# Patient Record
Sex: MALE | Race: WHITE | NOT HISPANIC OR LATINO | Employment: FULL TIME | URBAN - METROPOLITAN AREA
[De-identification: names, ages, dates, MRNs, and addresses within clinical notes are randomized per-mention and may not be internally consistent; named-entity substitution may affect disease eponyms.]

---

## 2017-03-04 ENCOUNTER — HOSPITAL ENCOUNTER (EMERGENCY)
Facility: CLINIC | Age: 32
Discharge: HOME | End: 2017-03-04
Attending: EMERGENCY MEDICINE | Admitting: EMERGENCY MEDICINE
Payer: COMMERCIAL

## 2017-03-04 VITALS
SYSTOLIC BLOOD PRESSURE: 160 MMHG | TEMPERATURE: 97.5 F | RESPIRATION RATE: 24 BRPM | OXYGEN SATURATION: 97 % | BODY MASS INDEX: 22.96 KG/M2 | HEART RATE: 120 BPM | DIASTOLIC BLOOD PRESSURE: 94 MMHG | HEIGHT: 69 IN | WEIGHT: 155 LBS

## 2017-03-04 DIAGNOSIS — F41.0 PANIC ATTACK: ICD-10-CM

## 2017-03-04 PROCEDURE — 99283 EMERGENCY DEPT VISIT LOW MDM: CPT

## 2017-03-04 PROCEDURE — 25000132 ZZH RX MED GY IP 250 OP 250 PS 637: Performed by: EMERGENCY MEDICINE

## 2017-03-04 PROCEDURE — 93005 ELECTROCARDIOGRAM TRACING: CPT

## 2017-03-04 RX ORDER — LORAZEPAM 1 MG/1
1 TABLET ORAL ONCE
Status: COMPLETED | OUTPATIENT
Start: 2017-03-04 | End: 2017-03-04

## 2017-03-04 RX ADMIN — LORAZEPAM 1 MG: 1 TABLET ORAL at 21:52

## 2017-03-04 ASSESSMENT — ENCOUNTER SYMPTOMS
VOMITING: 0
NERVOUS/ANXIOUS: 1
NAUSEA: 0
DIZZINESS: 1

## 2017-03-04 NOTE — ED AVS SNAPSHOT
[facility] Emergency Department    [address] [place] [address]    Phone:  [phone]    Fax:  [phone]                                       Wellington Guaman   MRN: [id number]    Department:  [facility] Emergency Department   Date of Visit:  3/4/2017           Patient Information     Date Of Birth          1985        Your diagnoses for this visit were:     Panic attack        You were seen by Jamel Chavez MD.      Follow-up Information     Please follow up.    Why:  Please recheck with your doctor on Tuesday        Discharge Instructions         Anxiety Reaction  Anxiety is the feeling we all get when we think something bad might happen. It is a normal response to stress and usually causes only a mild reaction. When anxiety becomes more severe, it can interfere with daily life. In some cases, you may not even be aware of what it is you re anxious about. There may also be a genetic link or it may be a learned behavior in the home.  Both psychological and physical triggers cause stress reaction. It's often a response to fear or emotional stress, real or imagined. This stress may come from home, family, work, or social relationships.  During an anxiety reaction, you may feel:    Helpless    Nervous    Depressed    Irritable  Your body may show signs of anxiety in many ways. You may experience:    Dry mouth    Shakiness    Dizziness    Weakness    Trouble breathing    Breathing fast (hyperventilating)    Chest pressure    Sweating    Headache    Nausea    Diarrhea    Tiredness    Inability to sleep    Sexual problems  Home care    Try to locate the sources of stress in your life. They may not be obvious. These may include:    Daily hassles of life (traffic jams, missed appointments, car troubles, etc.)    Major life changes, both good (new baby, job promotion) and bad (loss of job, loss of loved one)    Overload: feeling that you have too many  responsibilities and can't take care of all of them at once    Feeling helpless, feeling that your problems are beyond what you re able to solve    Notice how your body reacts to stress. Learn to listen to your body signals. This will help you take action before the stress becomes severe.    When you can, do something about the source of your stress. (Avoid hassles, limit the amount of change that happens in your life at one time and take a break when you feel overloaded).    Unfortunately, many stressful situations can't be avoided. It is necessary to learn how to better manage stress. There are many proven methods that will reduce your anxiety. These include simple things like exercise, good nutrition and adequate rest. Also, there are certain techniques that are helpful:    Relaxation    Breathing exercises    Visualization    Biofeedback    Meditation  For more information about this, consult your doctor or go to a local bookstore and review the many books and tapes available on this subject.  Follow-up care  If you feel that your anxiety is not responding to self-help measures, contact your doctor or make an appointment with a counselor. You may need short-term psychological counseling and temporary medicine to help you manage stress.  Call 911  Call your healthcare provider right away if any of these occur:    Trouble breathing    Confusion    Drowsiness or trouble wakening    Fainting or loss of consciousness    Rapid heart rate    Seizure    New chest pain that becomes more severe, lasts longer, or spreads into your shoulder, arm, neck, jaw, or back  When to seek medical advice  Call your healthcare provider right away if any of these occur:    Your symptoms get worse    Severe headache not relieved by rest and mild pain reliever    5299-4844 [employer]. [address] [place] [address], [place] [address]. All rights reserved. This information is not intended as a substitute for professional medical  care. Always follow your healthcare professional's instructions.          Future Appointments        Provider Department Dept Phone Center    3/7/2017 10:00 AM Susan Haase, APRN [facility] [phone] [facility]      24 Hour Appointment Hotline       To make an appointment at any [facility], call [phone] ([phone]). If you don't have a family doctor or clinic, we will help you find one. [facility] are conveniently located to serve the needs of you and your family.             Review of your medicines      Notice     You have not been prescribed any medications.            Procedures and tests performed during your visit     EKG 12-lead, tracing only      Orders Needing Specimen Collection     None      Pending Results     No orders found from 3/2/2017 to 3/5/2017.            Pending Culture Results     No orders found from 3/2/2017 to 3/5/2017.             Test Results from your hospital stay            Clinical Quality Measure: Blood Pressure Screening     Your blood pressure was checked while you were in the emergency department today. The last reading we obtained was  BP: (!) 160/94 . Please read the guidelines below about what these numbers mean and what you should do about them.  If your systolic blood pressure (the top number) is less than 120 and your diastolic blood pressure (the bottom number) is less than 80, then your blood pressure is normal. There is nothing more that you need to do about it.  If your systolic blood pressure (the top number) is 120-139 or your diastolic blood pressure (the bottom number) is 80-89, your blood pressure may be higher than it should be. You should have your blood pressure rechecked within a year by a primary care provider.  If your systolic blood pressure (the top number) is 140 or greater or your diastolic blood pressure (the bottom number) is 90 or greater, you may have high blood pressure. High blood pressure is treatable, but if  "left untreated over time it can put you at risk for heart attack, stroke, or kidney failure. You should have your blood pressure rechecked by a primary care provider within the next 4 weeks.  If your provider in the emergency department today gave you specific instructions to follow-up with your doctor or provider even sooner than that, you should follow that instruction and not wait for up to 4 weeks for your follow-up visit.        Thank you for choosing [facility]       Thank you for choosing [facility] for your care. Our goal is always to provide you with excellent care. Hearing back from our patients is one way we can continue to improve our services. Please take a few minutes to complete the written survey that you may receive in the mail after you visit with us. Thank you!        [employer] Information     [employer] lets you send messages to your doctor, view your test results, renew your prescriptions, schedule appointments and more. To sign up, go to [email or web address] . Click on \"Log in\" on the left side of the screen, which will take you to the Welcome page. Then click on \"Sign up Now\" on the right side of the page.     You will be asked to enter the access code listed below, as well as some personal information. Please follow the directions to create your username and password.     Your access code is: [email or web address]  Expires: 2017  9:12 PM     Your access code will  in 90 days. If you need help or a new code, please call your [facility] clinic or [phone].        Care EveryWhere ID     This is your Care EveryWhere ID. This could be used by other organizations to access your [facility] medical records  [id number]        After Visit Summary       This is your record. Keep this with you and show to your community pharmacist(s) and doctor(s) at your next visit.                  "

## 2017-03-04 NOTE — ED AVS SNAPSHOT
[facility] Emergency Department    [address]    [facility] [address]    Phone:  [phone]    Fax:  [phone]                                       Wellington Guaman   MRN: [id number]    Department:  [facility] Emergency Department   Date of Visit:  3/4/2017           After Visit Summary Signature Page     I have received my discharge instructions, and my questions have been answered. I have discussed any challenges I see with this plan with the nurse or doctor.    ..........................................................................................................................................  Patient/Patient Representative Signature      ..........................................................................................................................................  Patient Representative Print Name and Relationship to Patient    ..................................................               ................................................  Date                                            Time    ..........................................................................................................................................  Reviewed by Signature/Title    ...................................................              ..............................................  Date                                                            Time

## 2017-03-05 LAB — INTERPRETATION ECG - MUSE: NORMAL

## 2017-03-05 NOTE — ED NOTES
Pt arrives to the ED with symptoms of a panic attack. Pt states he has had many panic attacks in the past but feels that this one is different. Pt states having numbness/tingling in left arm, bilateral hands, and face. Pt states also feeling short of breath, dizzy and nauseous. Pt states these symptoms started around 1800. Does not take any medications for anxiety.

## 2017-03-05 NOTE — DISCHARGE INSTRUCTIONS

## 2017-03-05 NOTE — ED PROVIDER NOTES
"  History     Chief Complaint:  Panic Attack      HPI   Wellington Guaman is a 31 year old male with history of agoraphobia and anxiety who presents with is wife for evaluation of a panic attack - like symptoms.     The patient states that he was watching a movie at a movie theater at about 1800 this afternoon when he suddenly began to feel a pressure sensation to his head with associated troubled breathing and numbness / tingling sensation to his upper and lower extremities in addition to his lips. The patient states that he has a history of panic attacks though this one is much more severe in both intensity and chronicity. He states that he had no prior symptoms during the course of the day today.     The patient denies any suicidal or homicidal ideation or visual or auditory hallucinations. He reports no alcohol or drug ingestion. He states that he has otherwise been well with no fever, chills, cough, nasal congestion, nausea, vomiting, or changes in his bowel or bladder habits.    Allergies:  Ceclor   Citalopram      Medications:    The patient is currently on no regular medications.     Past Medical History:    Agoraphobia  Anxiety     Past Surgical History:    The patient does not have any pertinent past surgical history.     Family History:    No past pertinent family history.     Social History:  Negative for tobacco use.  Positive for alcohol use.    Marital Status:  Single [1]    Review of Systems   Gastrointestinal: Negative for nausea and vomiting.   Neurological: Positive for dizziness.        Positive for tingling sensation to upper and lower extremities.    Psychiatric/Behavioral: The patient is nervous/anxious.    All other systems reviewed and are negative.    Physical Exam   First Vitals:  BP: (!) 160/94  Pulse: 120  Heart Rate: 120  Temp: 97.5  F (36.4  C)  Resp: 24  Height: 175.3 cm (5' 9\")  Weight: 70.3 kg (155 lb)  SpO2: 100 %      Physical Exam   Constitutional:  Appears well-developed and " well-nourished. Alert. Conversant. Non toxic.  HENT:   Head: Atraumatic.   Nose: Nose normal.  Mouth/Throat: Oral mucosa is clear and moist. no trismus. Pharynx normal. Tonsils symmetric. No tonsillar enlargement, erythema, or exudate.  Eyes: Conjunctivae normal. EOM normal. Pupils equal, round, and reactive to light. No scleral icterus.   Neck: Normal range of motion. Neck supple. No tracheal deviation present. No JVD  Cardiovascular: Normal rate, regular rhythm. No gallop. No friction rub. No murmur heard. Symmetric radial artery pulses   Pulmonary/Chest: Effort normal. No stridor. No respiratory distress. No wheezes. No rales. No rhonchi . No tenderness.   Abdominal: Soft. Bowel sounds normal. No distension. No mass. No tenderness. No rebound. No guarding.   Musculoskeletal: Normal range of motion. No edema. No tenderness. No deformity   Lymph: No cervical adenopathy.   Neurological: Alert and oriented to person, place, and time. Normal strength. CN II-VII intact. No sensory deficit. GCS eye subscore is 4. GCS verbal subscore is 5. GCS motor subscore is 6. Normal coordination   Skin: Skin is warm and dry. No rash noted. No pallor. Normal capillary refill.  Psychiatric:  Somewhat anxious appearing.  Very polite.  Answers questions appropriately.  No signs of drug or alcohol intoxication.  Has a history of panic attacks in the past but never with symptoms like tonight.   He is very worried that he may be having a heart problem or stroke.  Wife seems supportive.  Deny any other recent  Stressors or events.  He has however been having enough anxiety symptoms in his life recently that he is already scheduled for an appointment with a primary care provider on Tuesday with plans to discuss these symptoms.    Emergency Department Course   ECG:  Time: 2200  Vent. Rate 86 bpm. ID interval 130. QRS duration 90. QT/QTc 382/457. P-R-T axis 66 51 26. Normal sinus rhythm with sinus arrhythmia. Normal ECG. Read time: 2219.       Interventions:  Ativan 1mg oral      Emergency Department Course:  Nursing notes and vitals reviewed. I performed an exam of the patient as documented above.     EKG obtained in the ED, see results above.      2301 I reevaluated the patient and provided an update in regards to his ED course, the patient states that he is feeling much improved.     Findings and plan explained to the Patient. Patient discharged home with instructions regarding supportive care, medications, and reasons to return. The importance of close follow-up was reviewed.     Impression & Plan    Medical Decision Making:  Wellington Guaman is a 31 year old male who came to the ER today with his wife for evaluations of feelings of anxiousness associated with heaviness in both of his arms, numbness in his hands and forearms, facial numbness, lightheadedness.  Sometimes began a few hours prior to arrival was watching a movie. His wife also noted that he seems somewhat anxious and was hyperventilating.  He has a history of anxiety and panic attacks but never with presenting with symptoms like these.  He is not feeling any chest pain or racing heart.  No antecedent symptoms or recent illnesses.  No recent travel.  No leg swelling.  No nausea or vomiting.  No drug use. No definite psychosocial stressor to cause a panic attack tonight.    EKG shows sinus rhythm with sinus arrhythmia and no ischemia.  No dysrhythmic genic Abnormality.  Cardiac monitor shows normal sinus rhythm.  After Ativan and his symptoms have resolved.  He is feeling back to normal.    At this point I suspect he was suffering from a panic attack  With symptoms related to hyperventilation.  He has an appointment to see a PCP in 2 days on Tuesday so I recommended that he keep that.  At this point is very polite  Oriented  Cooperative..  No indication for hold or  Inpatient admission.  He is comfortable and eager for discharge to home with his wife.    Critical Care time:   none    Diagnosis:    ICD-10-CM    1. Panic attack F41.0      I, Yogesh Gonzalez, am serving as a scribe on 3/4/2017 at 9:36 PM to personally document services performed by Jamel Chavez MD based on my observations and the provider's statements to me.      Yogesh Gonzalez  3/4/2017   [facility] EMERGENCY DEPARTMENT       Jamel Chavez MD  03/05/17 0049

## 2017-03-07 ENCOUNTER — OFFICE VISIT (OUTPATIENT)
Dept: FAMILY MEDICINE | Facility: CLINIC | Age: 32
End: 2017-03-07
Payer: COMMERCIAL

## 2017-03-07 VITALS
HEART RATE: 98 BPM | DIASTOLIC BLOOD PRESSURE: 78 MMHG | SYSTOLIC BLOOD PRESSURE: 114 MMHG | HEIGHT: 69 IN | WEIGHT: 156 LBS | RESPIRATION RATE: 12 BRPM | BODY MASS INDEX: 23.11 KG/M2 | TEMPERATURE: 97.8 F | OXYGEN SATURATION: 100 %

## 2017-03-07 DIAGNOSIS — F41.0 PANIC ATTACK: ICD-10-CM

## 2017-03-07 DIAGNOSIS — F33.0 MILD RECURRENT MAJOR DEPRESSION (H): Chronic | ICD-10-CM

## 2017-03-07 DIAGNOSIS — F41.9 ANXIETY: Primary | ICD-10-CM

## 2017-03-07 LAB
ALBUMIN SERPL-MCNC: 4.4 G/DL (ref 3.4–5)
ALP SERPL-CCNC: 78 U/L (ref 40–150)
ALT SERPL W P-5'-P-CCNC: 24 U/L (ref 0–70)
ANION GAP SERPL CALCULATED.3IONS-SCNC: 7 MMOL/L (ref 3–14)
AST SERPL W P-5'-P-CCNC: 10 U/L (ref 0–45)
BASOPHILS # BLD AUTO: 0.1 10E9/L (ref 0–0.2)
BASOPHILS NFR BLD AUTO: 0.9 %
BILIRUB SERPL-MCNC: 0.7 MG/DL (ref 0.2–1.3)
BUN SERPL-MCNC: 11 MG/DL (ref 7–30)
CALCIUM SERPL-MCNC: 9.3 MG/DL (ref 8.5–10.1)
CHLORIDE SERPL-SCNC: 107 MMOL/L (ref 94–109)
CO2 SERPL-SCNC: 27 MMOL/L (ref 20–32)
CREAT SERPL-MCNC: 0.71 MG/DL (ref 0.66–1.25)
DIFFERENTIAL METHOD BLD: NORMAL
EOSINOPHIL # BLD AUTO: 0.1 10E9/L (ref 0–0.7)
EOSINOPHIL NFR BLD AUTO: 1.3 %
ERYTHROCYTE [DISTWIDTH] IN BLOOD BY AUTOMATED COUNT: 13.4 % (ref 10–15)
GFR SERPL CREATININE-BSD FRML MDRD: NORMAL ML/MIN/1.7M2
GLUCOSE SERPL-MCNC: 73 MG/DL (ref 70–99)
HCT VFR BLD AUTO: 44.9 % (ref 40–53)
HGB BLD-MCNC: 15.5 G/DL (ref 13.3–17.7)
LYMPHOCYTES # BLD AUTO: 1.5 10E9/L (ref 0.8–5.3)
LYMPHOCYTES NFR BLD AUTO: 27 %
MCH RBC QN AUTO: 29.9 PG (ref 26.5–33)
MCHC RBC AUTO-ENTMCNC: 34.5 G/DL (ref 31.5–36.5)
MCV RBC AUTO: 87 FL (ref 78–100)
MONOCYTES # BLD AUTO: 0.5 10E9/L (ref 0–1.3)
MONOCYTES NFR BLD AUTO: 8.8 %
NEUTROPHILS # BLD AUTO: 3.4 10E9/L (ref 1.6–8.3)
NEUTROPHILS NFR BLD AUTO: 62 %
PLATELET # BLD AUTO: 387 10E9/L (ref 150–450)
POTASSIUM SERPL-SCNC: 4.1 MMOL/L (ref 3.4–5.3)
PROT SERPL-MCNC: 8.2 G/DL (ref 6.8–8.8)
RBC # BLD AUTO: 5.18 10E12/L (ref 4.4–5.9)
SODIUM SERPL-SCNC: 141 MMOL/L (ref 133–144)
TSH SERPL DL<=0.005 MIU/L-ACNC: 1.61 MU/L (ref 0.4–4)
WBC # BLD AUTO: 5.4 10E9/L (ref 4–11)

## 2017-03-07 PROCEDURE — 36415 COLL VENOUS BLD VENIPUNCTURE: CPT | Performed by: NURSE PRACTITIONER

## 2017-03-07 PROCEDURE — 80050 GENERAL HEALTH PANEL: CPT | Performed by: NURSE PRACTITIONER

## 2017-03-07 PROCEDURE — 99213 OFFICE O/P EST LOW 20 MIN: CPT | Performed by: NURSE PRACTITIONER

## 2017-03-07 RX ORDER — CLONAZEPAM 0.5 MG/1
0.25-0.5 TABLET ORAL 2 TIMES DAILY PRN
Qty: 10 TABLET | Refills: 0 | Status: SHIPPED | OUTPATIENT
Start: 2017-03-07 | End: 2017-08-10

## 2017-03-07 RX ORDER — FLUOXETINE 10 MG/1
CAPSULE ORAL
Qty: 45 CAPSULE | Refills: 1 | Status: SHIPPED | OUTPATIENT
Start: 2017-03-07 | End: 2017-03-08

## 2017-03-07 ASSESSMENT — PATIENT HEALTH QUESTIONNAIRE - PHQ9: 5. POOR APPETITE OR OVEREATING: NEARLY EVERY DAY

## 2017-03-07 ASSESSMENT — ANXIETY QUESTIONNAIRES
6. BECOMING EASILY ANNOYED OR IRRITABLE: NEARLY EVERY DAY
GAD7 TOTAL SCORE: 20
1. FEELING NERVOUS, ANXIOUS, OR ON EDGE: NEARLY EVERY DAY
2. NOT BEING ABLE TO STOP OR CONTROL WORRYING: NEARLY EVERY DAY
3. WORRYING TOO MUCH ABOUT DIFFERENT THINGS: MORE THAN HALF THE DAYS
IF YOU CHECKED OFF ANY PROBLEMS ON THIS QUESTIONNAIRE, HOW DIFFICULT HAVE THESE PROBLEMS MADE IT FOR YOU TO DO YOUR WORK, TAKE CARE OF THINGS AT HOME, OR GET ALONG WITH OTHER PEOPLE: VERY DIFFICULT
5. BEING SO RESTLESS THAT IT IS HARD TO SIT STILL: NEARLY EVERY DAY
7. FEELING AFRAID AS IF SOMETHING AWFUL MIGHT HAPPEN: NEARLY EVERY DAY

## 2017-03-07 NOTE — NURSING NOTE
"Chief Complaint   Patient presents with     ER F/U       Initial /78 (BP Location: Left arm, Patient Position: Chair, Cuff Size: Adult Regular)  Pulse 98  Temp 97.8  F (36.6  C) (Oral)  Resp 12  Ht 5' 9\" (1.753 m)  Wt 156 lb (70.8 kg)  SpO2 100%  BMI 23.04 kg/m2 Estimated body mass index is 23.04 kg/(m^2) as calculated from the following:    Height as of this encounter: 5' 9\" (1.753 m).    Weight as of this encounter: 156 lb (70.8 kg).  Medication Reconciliation: complete   Cristy Puente CMA      "

## 2017-03-07 NOTE — PROGRESS NOTES
"  SUBJECTIVE:                                                    Wellington Guaman is a 31 year old male who presents to clinic today for the following health issues:    ED/UC Followup:    Facility:  [facility]  Date of visit: 3/4/17  Reason for visit: panic attack  Current Status: still having panic attacks, dizziness and nausea     Wellington reports going to the ED after panic attack-like symptoms 3 days ago. After leaving the movie theater w/ his wife that evening  He began feeling dizzy and nauseous. He has a hx of panic attacks but states \"has not felt anything like it before.\" He also noticed sudden onset of pressure in LUE, generalized numbness, racing heart, and SOB.     Wellington has experienced anxiety/panic attacks \"on and off\" for several years but has been worse the past few weeks. Also difficulty sleeping for the past year. Describes feeling \"too energized\" to sleep. He is able to fall asleep but cannot go back to sleep after waking up during the night. Has had difficulty sleeping every night for the past week. Getting on average 4 hours of sleep per night. He has not tried any medications to help w/ sleep. Does not drink caffeine. Denies episodes of high energy followed by episodes of low energy/depression.  PHQ 9 score of 10, JOSEE 7 score of 20.  Denies thoughts of harming self or others.     Wellington's symptoms have also been affecting his work. 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 Over the past few weeks he has missed some work due to feeling dizzy and nauseous and has been late due several days due to difficulty driving. States having to pull over an take deep breaths on way too work. He has not experienced any recent life changes or new stressors.     About 5 years ago he was prescribed alprazolam and took for a few years as needed when he felt \"on the edge.\" Was also prescribed metoprolol succinate in 6/2015 for PVCs but experienced uncomfortable " "side effects such as slow heart rate. Not currently taking any medications regularly.      Problem list and histories reviewed & adjusted, as indicated.  Additional history: as documented    Patient Active Problem List   Diagnosis     Hyperlipidemia with target LDL less than 130     Mild recurrent major depression since 2006      Panic attacks     Headache     Tachycardia     PVC's (premature ventricular contractions)     Past Surgical History   Procedure Laterality Date     No history of surgery         Social History   Substance Use Topics     Smoking status: Never Smoker     Smokeless tobacco: Never Used     Alcohol use Yes      Comment: 4 beers/wk     Family History   Problem Relation Age of Onset     CEREBROVASCULAR DISEASE       Family History Negative Mother      Family History Negative Father          No current outpatient prescriptions on file.     Allergies   Allergen Reactions     Ceclor [Cefaclor Monohydrate]      Citalopram      Nausea, panic        Reviewed and updated as needed this visit by clinical staff  Allergies  Meds       Reviewed and updated as needed this visit by Provider         ROS:  Constitutional, HEENT, cardiovascular, pulmonary, GI, , musculoskeletal, neuro, skin, endocrine and psych systems are negative, except as otherwise noted.    This document serves as a record of the services and decisions personally performed and made by Susan Haase, CNP. It was created on her behalf by Karen White, a trained medical scribe. The creation of this document is based the provider's statements to the medical scribe.  Karen White March 7, 2017 10:20 AM     OBJECTIVE:                                                    /78 (BP Location: Left arm, Patient Position: Chair, Cuff Size: Adult Regular)  Pulse 98  Temp 97.8  F (36.6  C) (Oral)  Resp 12  Ht 1.753 m (5' 9\")  Wt 70.8 kg (156 lb)  SpO2 100%  BMI 23.04 kg/m2  Body mass index is 23.04 kg/(m^2).  GENERAL: healthy, alert and no " distress  NECK: no adenopathy, no asymmetry, masses, or scars and thyroid normal to palpation  RESP: lungs clear to auscultation - no rales, rhonchi or wheezes  CV: regular rate and rhythm, normal S1 S2, no S3 or S4, no murmur, click or rub, no peripheral edema  NEURO: Normal strength and tone, mentation intact and speech normal  PSYCH: mentation appears normal, affect normal/bright    Diagnostic Test Results:  Results for orders placed or performed in visit on 03/07/17 (from the past 24 hour(s))   CBC with platelets differential   Result Value Ref Range    WBC 5.4 4.0 - 11.0 10e9/L    RBC Count 5.18 4.4 - 5.9 10e12/L    Hemoglobin 15.5 13.3 - 17.7 g/dL    Hematocrit 44.9 40.0 - 53.0 %    MCV 87 78 - 100 fl    MCH 29.9 26.5 - 33.0 pg    MCHC 34.5 31.5 - 36.5 g/dL    RDW 13.4 10.0 - 15.0 %    Platelet Count 387 150 - 450 10e9/L    Diff Method Automated Method     % Neutrophils 62.0 %    % Lymphocytes 27.0 %    % Monocytes 8.8 %    % Eosinophils 1.3 %    % Basophils 0.9 %    Absolute Neutrophil 3.4 1.6 - 8.3 10e9/L    Absolute Lymphocytes 1.5 0.8 - 5.3 10e9/L    Absolute Monocytes 0.5 0.0 - 1.3 10e9/L    Absolute Eosinophils 0.1 0.0 - 0.7 10e9/L    Absolute Basophils 0.1 0.0 - 0.2 10e9/L        ASSESSMENT/PLAN:                                                    Wellington was seen today for er f/u.    Diagnoses and all orders for this visit:    Anxiety:  Will contact therapist to set up appointment.    -     FLUoxetine (PROZAC) 10 MG capsule; Take 1 capsule every day for 2 weeks, after 2 weeks increase to 2 capsules (20mg) per day  Discussed risks and benefits of medication, need to take on a daily basis, will take at least 2-4 weeks to notice decrease in depression or anxiety, if symptoms of depression worsen stop taking medication and notify the clinic.  Is aware of emergency resources.  Encouraged increased in exercise and counseling.   Panic attacks: discussed not taking clonazepam and driving.   -     DEPRESSION  ACTION PLAN (DAP)  -     CBC with platelets differential  -     Comprehensive metabolic panel  -     TSH with free T4 reflex  -     clonazePAM (KLONOPIN) 0.5 MG tablet; Take 0.5-1 tablets (0.25-0.5 mg) by mouth 2 times daily as needed for anxiety    Mild recurrent major depression since 2006 :  Depression under adequate control, increased anxiety at this time.       The information in this document, created by the medical scribe for me, accurately reflects the services I personally performed and the decisions made by me. I have reviewed and approved this document for accuracy.   Susan Haase, CNP   Follow up in 4-6 weeks, sooner as needed.  Susan Haase, APRN CNP  [facility]

## 2017-03-07 NOTE — PATIENT INSTRUCTIONS

## 2017-03-07 NOTE — MR AVS SNAPSHOT
After Visit Summary   3/7/2017    Wellington Guaman    MRN: [id number]           Patient Information     Date Of Birth          1985        Visit Information        Provider Department      3/7/2017 10:00 AM Haase, Susan Rachele, APRN [facility]        Today's Diagnoses     Panic attacks    -  1    Tachycardia        Anxiety          Care Instructions      Anxiety Reaction  Anxiety is the feeling we all get when we think something bad might happen. It is a normal response to stress and usually causes only a mild reaction. When anxiety becomes more severe, it can interfere with daily life. In some cases, you may not even be aware of what it is you re anxious about. There may also be a genetic link or it may be a learned behavior in the home.  Both psychological and physical triggers cause stress reaction. It's often a response to fear or emotional stress, real or imagined. This stress may come from home, family, work, or social relationships.  During an anxiety reaction, you may feel:    Helpless    Nervous    Depressed    Irritable  Your body may show signs of anxiety in many ways. You may experience:    Dry mouth    Shakiness    Dizziness    Weakness    Trouble breathing    Breathing fast (hyperventilating)    Chest pressure    Sweating    Headache    Nausea    Diarrhea    Tiredness    Inability to sleep    Sexual problems  Home care    Try to locate the sources of stress in your life. They may not be obvious. These may include:    Daily hassles of life (traffic jams, missed appointments, car troubles, etc.)    Major life changes, both good (new baby, job promotion) and bad (loss of job, loss of loved one)    Overload: feeling that you have too many responsibilities and can't take care of all of them at once    Feeling helpless, feeling that your problems are beyond what you re able to solve    Notice how your body reacts to stress. Learn to listen to your body signals.  This will help you take action before the stress becomes severe.    When you can, do something about the source of your stress. (Avoid hassles, limit the amount of change that happens in your life at one time and take a break when you feel overloaded).    Unfortunately, many stressful situations can't be avoided. It is necessary to learn how to better manage stress. There are many proven methods that will reduce your anxiety. These include simple things like exercise, good nutrition and adequate rest. Also, there are certain techniques that are helpful:    Relaxation    Breathing exercises    Visualization    Biofeedback    Meditation  For more information about this, consult your doctor or go to a local bookstore and review the many books and tapes available on this subject.  Follow-up care  If you feel that your anxiety is not responding to self-help measures, contact your doctor or make an appointment with a counselor. You may need short-term psychological counseling and temporary medicine to help you manage stress.  Call 911  Call your healthcare provider right away if any of these occur:    Trouble breathing    Confusion    Drowsiness or trouble wakening    Fainting or loss of consciousness    Rapid heart rate    Seizure    New chest pain that becomes more severe, lasts longer, or spreads into your shoulder, arm, neck, jaw, or back  When to seek medical advice  Call your healthcare provider right away if any of these occur:    Your symptoms get worse    Severe headache not relieved by rest and mild pain reliever    6395-0774 [employer]. [address] [place] [address]. All rights reserved. This information is not intended as a substitute for professional medical care. Always follow your healthcare professional's instructions.              Follow-ups after your visit        Follow-up notes from your care team     Return in about 4 weeks (around 4/4/2017).      Who to contact     If you  "have questions or need follow up information about today's clinic visit or your schedule please contact [facility] directly at [phone].  Normal or non-critical lab and imaging results will be communicated to you by MyChart, letter or phone within 4 business days after the clinic has received the results. If you do not hear from us within 7 days, please contact the clinic through [employer] or phone. If you have a critical or abnormal lab result, we will notify you by phone as soon as possible.  Submit refill requests through [employer] or call your pharmacy and they will forward the refill request to us. Please allow 3 business days for your refill to be completed.          Additional Information About Your Visit        [employer] Information     [employer] lets you send messages to your doctor, view your test results, renew your prescriptions, schedule appointments and more. To sign up, go to [email or web address] . Click on \"Log in\" on the left side of the screen, which will take you to the Welcome page. Then click on \"Sign up Now\" on the right side of the page.     You will be asked to enter the access code listed below, as well as some personal information. Please follow the directions to create your username and password.     Your access code is: [email or web address]  Expires: 2017  9:12 PM     Your access code will  in 90 days. If you need help or a new code, please call your [place] clinic or [phone].        Care EveryWhere ID     This is your Care EveryWhere ID. This could be used by other organizations to access your [place] medical records  [id number]        Your Vitals Were     Pulse Temperature Respirations Height Pulse Oximetry BMI (Body Mass Index)    98 97.8  F (36.6  C) (Oral) 12 5' 9\" (1.753 m) 100% 23.04 kg/m2       Blood Pressure from Last 3 Encounters:   17 114/78   17 (!) 160/94   06/18/15 124/64    Weight from Last 3 Encounters:   17 156 lb (70.8 kg) "   03/04/17 155 lb (70.3 kg)   06/18/15 164 lb (74.4 kg)              We Performed the Following     CBC with platelets differential     Comprehensive metabolic panel     DEPRESSION ACTION PLAN (DAP)     TSH with free T4 reflex          Today's Medication Changes          These changes are accurate as of: 3/7/17 11:01 AM.  If you have any questions, ask your nurse or doctor.               Start taking these medicines.        Dose/Directions    clonazePAM 0.5 MG tablet   Commonly known as:  klonoPIN   Used for:  Panic attack        Dose:  0.25-0.5 mg   Take 0.5-1 tablets (0.25-0.5 mg) by mouth 2 times daily as needed for anxiety   Quantity:  10 tablet   Refills:  0       FLUoxetine 10 MG capsule   Commonly known as:  PROzac   Used for:  Anxiety        Take 1 capsule every day for 2 weeks, after 2 weeks increase to 2 capsules (20mg) per day   Quantity:  45 capsule   Refills:  1            Where to get your medicines      These medications were sent to [facility] [address] [place] [address] [address] [address] [facility] [address] [facility] [address]     Phone:  [phone]     FLUoxetine 10 MG capsule         Some of these will need a paper prescription and others can be bought over the counter.  Ask your nurse if you have questions.     Bring a paper prescription for each of these medications     clonazePAM 0.5 MG tablet                Primary Care Provider    Physician No Ref-Primary       No address on file        Thank you!     Thank you for choosing [facility]  for your care. Our goal is always to provide you with excellent care. Hearing back from our patients is one way we can continue to improve our services. Please take a few minutes to complete the written survey that you may receive in the mail after your visit with us. Thank you!             Your Updated Medication List - Protect others around you: Learn how to safely use, store and throw away your  medicines at www.disposemymeds.org.          This list is accurate as of: 3/7/17 11:01 AM.  Always use your most recent med list.                   Brand Name Dispense Instructions for use    clonazePAM 0.5 MG tablet    klonoPIN    10 tablet    Take 0.5-1 tablets (0.25-0.5 mg) by mouth 2 times daily as needed for anxiety       FLUoxetine 10 MG capsule    PROzac    45 capsule    Take 1 capsule every day for 2 weeks, after 2 weeks increase to 2 capsules (20mg) per day

## 2017-03-07 NOTE — LETTER
[facility]  [address] [place], [address]  [phone]        March 8, 2017      Wellington Guaman  [address] [facility] [address]        Sarmad Martinez lab results are as below:   1)  TSH (thyroid level) 1.61 which is normal (range 0.4-4)   2)  CBC (checks for anemia and infection) was normal.   3)  Glucose was normal at 73 (normal fasting is <100).     Results for orders placed or performed in visit on 03/07/17   CBC with platelets differential   Result Value Ref Range    WBC 5.4 4.0 - 11.0 10e9/L    RBC Count 5.18 4.4 - 5.9 10e12/L    Hemoglobin 15.5 13.3 - 17.7 g/dL    Hematocrit 44.9 40.0 - 53.0 %    MCV 87 78 - 100 fl    MCH 29.9 26.5 - 33.0 pg    MCHC 34.5 31.5 - 36.5 g/dL    RDW 13.4 10.0 - 15.0 %    Platelet Count 387 150 - 450 10e9/L    Diff Method Automated Method     % Neutrophils 62.0 %    % Lymphocytes 27.0 %    % Monocytes 8.8 %    % Eosinophils 1.3 %    % Basophils 0.9 %    Absolute Neutrophil 3.4 1.6 - 8.3 10e9/L    Absolute Lymphocytes 1.5 0.8 - 5.3 10e9/L    Absolute Monocytes 0.5 0.0 - 1.3 10e9/L    Absolute Eosinophils 0.1 0.0 - 0.7 10e9/L    Absolute Basophils 0.1 0.0 - 0.2 10e9/L   Comprehensive metabolic panel   Result Value Ref Range    Sodium 141 133 - 144 mmol/L    Potassium 4.1 3.4 - 5.3 mmol/L    Chloride 107 94 - 109 mmol/L    Carbon Dioxide 27 20 - 32 mmol/L    Anion Gap 7 3 - 14 mmol/L    Glucose 73 70 - 99 mg/dL    Urea Nitrogen 11 7 - 30 mg/dL    Creatinine 0.71 0.66 - 1.25 mg/dL    GFR Estimate >90  Non  GFR Calc   >60 mL/min/1.7m2    GFR Estimate If Black >90   GFR Calc   >60 mL/min/1.7m2    Calcium 9.3 8.5 - 10.1 mg/dL    Bilirubin Total 0.7 0.2 - 1.3 mg/dL    Albumin 4.4 3.4 - 5.0 g/dL    Protein Total 8.2 6.8 - 8.8 g/dL    Alkaline Phosphatase 78 40 - 150 U/L    ALT 24 0 - 70 U/L    AST 10 0 - 45 U/L   TSH with free T4 reflex   Result Value Ref Range    TSH 1.61 0.40 - 4.00 mU/L     If you have any  questions do not hesitate to call the clinic to discuss the results with me further.     Sincerely,     Susan Haase, CNP

## 2017-03-08 ENCOUNTER — TELEPHONE (OUTPATIENT)
Dept: FAMILY MEDICINE | Facility: CLINIC | Age: 32
End: 2017-03-08

## 2017-03-08 DIAGNOSIS — F41.9 ANXIETY: Primary | ICD-10-CM

## 2017-03-08 RX ORDER — FLUOXETINE 10 MG/1
CAPSULE ORAL
Qty: 14 CAPSULE | Refills: 0 | Status: SHIPPED | OUTPATIENT
Start: 2017-03-08 | End: 2017-05-15

## 2017-03-08 ASSESSMENT — ANXIETY QUESTIONNAIRES: GAD7 TOTAL SCORE: 20

## 2017-03-08 ASSESSMENT — PATIENT HEALTH QUESTIONNAIRE - PHQ9: SUM OF ALL RESPONSES TO PHQ QUESTIONS 1-9: 10

## 2017-03-08 NOTE — TELEPHONE ENCOUNTER
Fax from [facility], insurance does not cover fluoxetine directions, needs 2 separate prescriptions sent, SH FYI  Prescription approved per [facility] Refill Protocol.  Shannon Fortune, RN, BSN

## 2017-05-15 ENCOUNTER — OFFICE VISIT (OUTPATIENT)
Dept: FAMILY MEDICINE | Facility: CLINIC | Age: 32
End: 2017-05-15
Payer: COMMERCIAL

## 2017-05-15 VITALS
DIASTOLIC BLOOD PRESSURE: 76 MMHG | HEIGHT: 69 IN | BODY MASS INDEX: 22.36 KG/M2 | TEMPERATURE: 97.9 F | WEIGHT: 151 LBS | SYSTOLIC BLOOD PRESSURE: 120 MMHG | HEART RATE: 74 BPM | RESPIRATION RATE: 12 BRPM

## 2017-05-15 DIAGNOSIS — F33.0 MILD RECURRENT MAJOR DEPRESSION (H): Primary | Chronic | ICD-10-CM

## 2017-05-15 DIAGNOSIS — K21.00 GASTROESOPHAGEAL REFLUX DISEASE WITH ESOPHAGITIS: ICD-10-CM

## 2017-05-15 PROCEDURE — 99213 OFFICE O/P EST LOW 20 MIN: CPT | Performed by: NURSE PRACTITIONER

## 2017-05-15 ASSESSMENT — ANXIETY QUESTIONNAIRES
6. BECOMING EASILY ANNOYED OR IRRITABLE: MORE THAN HALF THE DAYS
2. NOT BEING ABLE TO STOP OR CONTROL WORRYING: MORE THAN HALF THE DAYS
7. FEELING AFRAID AS IF SOMETHING AWFUL MIGHT HAPPEN: MORE THAN HALF THE DAYS
5. BEING SO RESTLESS THAT IT IS HARD TO SIT STILL: MORE THAN HALF THE DAYS
1. FEELING NERVOUS, ANXIOUS, OR ON EDGE: MORE THAN HALF THE DAYS
3. WORRYING TOO MUCH ABOUT DIFFERENT THINGS: MORE THAN HALF THE DAYS
GAD7 TOTAL SCORE: 14

## 2017-05-15 ASSESSMENT — PATIENT HEALTH QUESTIONNAIRE - PHQ9: 5. POOR APPETITE OR OVEREATING: MORE THAN HALF THE DAYS

## 2017-05-15 NOTE — NURSING NOTE
"Chief Complaint   Patient presents with     Recheck Medication       Initial /76 (BP Location: Right arm, Patient Position: Chair, Cuff Size: Adult Regular)  Pulse 74  Temp 97.9  F (36.6  C) (Oral)  Resp 12  Ht 5' 9\" (1.753 m)  Wt 151 lb (68.5 kg)  BMI 22.3 kg/m2 Estimated body mass index is 22.3 kg/(m^2) as calculated from the following:    Height as of this encounter: 5' 9\" (1.753 m).    Weight as of this encounter: 151 lb (68.5 kg).  Medication Reconciliation: complete   Cristy Puente CMA      "

## 2017-05-15 NOTE — PROGRESS NOTES
SUBJECTIVE:                                                    Wellington Guaman is a 32 year old male who presents to clinic today for the following health issues:    Depression and Anxiety Follow-Up    Status since last visit: Improved     Other associated symptoms:None    Complicating factors:     Significant life event: No     Current substance abuse: None    Wellington reports that he did not start fluoxetine which he was prescribed at last visit on 03/07/2017. States that he has taken clonazepam a few times for minor episodes of anxiety but no major panic attacks since ED visit on 03/04/2017. Work is going well. No difficulties driving.    PHQ-9: 12  JOSEE-7: 14    Sleep -- Getting and average of 3-4 hours of sleep/night. Bedtime is around 23:00-24:00. Able to fall asleep but often waking around 3 AM and unable to fall back asleep. Tried melatonin which has not helped much. Problem has been worse over past 1-2 years.    Gastric Reflux -- Experiences occasional heart burn after eating. Associated symptoms after meals include feeling like heart racing is racing, pulse in throat, and generalized fatigue. Symptoms occur randomly at frequency of a couple times/week. Problem began 1-2 years ago. Does not seem to be exacerbated by any particular foods. Tried Tums which sometimes helps. Denies caffeine intake. BMs are regular. Denies weight loss, constipation, or diarrhea.     PHQ-9 SCORE 1/31/2014 6/18/2015 3/7/2017   Total Score 15 8 -   Total Score - - 10     JOSEE-7 SCORE 4/26/2012 3/7/2017   Total Score 2 -   Total Score - 20          Amount of exercise or physical activity: 4-5 days/week for an average of 15-30 minutes    Problems taking medications regularly: No    Medication side effects: none    Diet: regular (no restrictions)    Problem list and histories reviewed & adjusted, as indicated.  Additional history: as documented    Patient Active Problem List   Diagnosis     Hyperlipidemia with target LDL less than  "130     Mild recurrent major depression since 2006      Panic attacks     Tachycardia     PVC's (premature ventricular contractions)     Past Surgical History:   Procedure Laterality Date     NO HISTORY OF SURGERY         Social History   Substance Use Topics     Smoking status: Never Smoker     Smokeless tobacco: Never Used     Alcohol use Yes      Comment: 4 beers/wk     Family History   Problem Relation Age of Onset     Family History Negative Mother      Family History Negative Father      CEREBROVASCULAR DISEASE Other          Current Outpatient Prescriptions   Medication Sig Dispense Refill     omeprazole (PRILOSEC) 20 MG CR capsule Take 1 capsule (20 mg) by mouth daily 30 capsule 1     clonazePAM (KLONOPIN) 0.5 MG tablet Take 0.5-1 tablets (0.25-0.5 mg) by mouth 2 times daily as needed for anxiety 10 tablet 0     Allergies   Allergen Reactions     Ceclor [Cefaclor Monohydrate]      Citalopram      Nausea, panic      Reviewed and updated as needed this visit by clinical staff  Tobacco  Allergies  Med Hx  Surg Hx  Fam Hx  Soc Hx      Reviewed and updated as needed this visit by Provider    ROS:  Constitutional, HEENT, cardiovascular, pulmonary, GI, , musculoskeletal, neuro, skin, endocrine and psych systems are negative, except as otherwise noted.    This document serves as a record of the services and decisions personally performed and made by Susan Haase, CNP. It was created on her behalf by Karen White, a trained medical scribe. The creation of this document is based the provider's statements to the medical scribe.  Karen White May 15, 2017 10:21 AM   OBJECTIVE:                                                    /76 (BP Location: Right arm, Patient Position: Chair, Cuff Size: Adult Regular)  Pulse 74  Temp 97.9  F (36.6  C) (Oral)  Resp 12  Ht 1.753 m (5' 9\")  Wt 68.5 kg (151 lb)  BMI 22.3 kg/m2  Body mass index is 22.3 kg/(m^2).  GENERAL: healthy, alert and no distress  HENT: " ear canals and TM's normal, nose and mouth without ulcers or lesions  NECK: no adenopathy, no asymmetry, masses, or scars and thyroid normal to palpation  RESP: lungs clear to auscultation - no rales, rhonchi or wheezes  CV: regular rate and rhythm, normal S1 S2, no S3 or S4, no murmur, click or rub, no peripheral edema   ABDOMEN: soft, nontender, no hepatosplenomegaly, no masses and bowel sounds normal  NEURO: Normal strength and tone, mentation intact and speech normal  PSYCH: mentation appears normal, affect normal/bright    Diagnostic Test Results:  none      ASSESSMENT/PLAN:                                                      Wellington was seen today for recheck medication.    Diagnoses and all orders for this visit:    Mild recurrent major depression since 2006 and Anxiety: Patient would like to see if symptoms of anxiety resolve if GERD is controlled, discussed they are not likely connected.      Gastroesophageal reflux disease with esophagitis: discussed eating nonspicy foods, avoid caffeine, eat last meal prior to 6 pm.    -     omeprazole (PRILOSEC) 20 MG CR capsule; Take 1 capsule (20 mg) by mouth daily  -     GASTROENTEROLOGY ADULT REF CONSULT ONLY    Follow up visit in 3 months sooner as needed.    The information in this document, created by the medical scribe for me, accurately reflects the services I personally performed and the decisions made by me. I have reviewed and approved this document for accuracy.   Susan Haase, APRN [facility]

## 2017-05-15 NOTE — MR AVS SNAPSHOT
After Visit Summary   5/15/2017    Wellington Guaman    MRN: [id number]           Patient Information     Date Of Birth          1985        Visit Information        Provider Department      5/15/2017 10:00 AM Haase, Susan Rachele, APRN CNP [facility]        Today's Diagnoses     Mild recurrent major depression since 2006     -  1    Gastroesophageal reflux disease with esophagitis           Follow-ups after your visit        Additional Services     GASTROENTEROLOGY ADULT REF CONSULT ONLY       Preferred Location: [place] [facility] [phone]      Please be aware that coverage of these services is subject to the terms and limitations of your health insurance plan.  Call member services at your health plan with any benefit or coverage questions.  Any procedures must be performed at a [facility] facility OR coordinated by your clinic's referral office.    Please bring the following with you to your appointment:    (1) Any X-Rays, CTs or MRIs which have been performed.  Contact the facility where they were done to arrange for  prior to your scheduled appointment.    (2) List of current medications   (3) This referral request   (4) Any documents/labs given to you for this referral                  Follow-up notes from your care team     Return if symptoms worsen or fail to improve.      Who to contact     If you have questions or need follow up information about today's clinic visit or your schedule please contact [facility] directly at [phone].  Normal or non-critical lab and imaging results will be communicated to you by MyChart, letter or phone within 4 business days after the clinic has received the results. If you do not hear from us within 7 days, please contact the clinic through MyChart or phone. If you have a critical or abnormal lab result, we will notify you by phone as soon as possible.  Submit refill requests through [employer] or call your pharmacy  "and they will forward the refill request to us. Please allow 3 business days for your refill to be completed.          Additional Information About Your Visit        [employer] Information     [employer] lets you send messages to your doctor, view your test results, renew your prescriptions, schedule appointments and more. To sign up, go to [email or web address] . Click on \"Log in\" on the left side of the screen, which will take you to the Welcome page. Then click on \"Sign up Now\" on the right side of the page.     You will be asked to enter the access code listed below, as well as some personal information. Please follow the directions to create your username and password.     Your access code is: [email or web address]  Expires: 2017 10:12 PM     Your access code will  in 90 days. If you need help or a new code, please call your [place] clinic or [phone].        Care EveryWhere ID     This is your [facility] EveryWhere ID. This could be used by other organizations to access your [place] medical records  [id number]        Your Vitals Were     Pulse Temperature Respirations Height BMI (Body Mass Index)       74 97.9  F (36.6  C) (Oral) 12 5' 9\" (1.753 m) 22.3 kg/m2        Blood Pressure from Last 3 Encounters:   05/15/17 120/76   17 114/78   17 (!) 160/94    Weight from Last 3 Encounters:   05/15/17 151 lb (68.5 kg)   17 156 lb (70.8 kg)   17 155 lb (70.3 kg)              We Performed the Following     GASTROENTEROLOGY ADULT REF CONSULT ONLY          Today's Medication Changes          These changes are accurate as of: 5/15/17 10:30 AM.  If you have any questions, ask your nurse or doctor.               Start taking these medicines.        Dose/Directions    omeprazole 20 MG CR capsule   Commonly known as:  priLOSEC   Used for:  Gastroesophageal reflux disease with esophagitis   Started by:  Haase, Susan Rachele, APRN CNP        Dose:  20 mg   Take 1 capsule (20 mg) by mouth daily   Quantity:  " 30 capsule   Refills:  1            Where to get your medicines      These medications were sent to [facility] - [place], [place] - [address] AT [facility] [address], [facility] [address]     Phone:  [phone]     omeprazole 20 MG CR capsule                Primary Care Provider    Physician No Ref-Primary       No address on file        Thank you!     Thank you for choosing [facility]  for your care. Our goal is always to provide you with excellent care. Hearing back from our patients is one way we can continue to improve our services. Please take a few minutes to complete the written survey that you may receive in the mail after your visit with us. Thank you!             Your Updated Medication List - Protect others around you: Learn how to safely use, store and throw away your medicines at www.disposemymeds.org.          This list is accurate as of: 5/15/17 10:30 AM.  Always use your most recent med list.                   Brand Name Dispense Instructions for use    clonazePAM 0.5 MG tablet    klonoPIN    10 tablet    Take 0.5-1 tablets (0.25-0.5 mg) by mouth 2 times daily as needed for anxiety       omeprazole 20 MG CR capsule    priLOSEC    30 capsule    Take 1 capsule (20 mg) by mouth daily

## 2017-05-16 ASSESSMENT — ANXIETY QUESTIONNAIRES: GAD7 TOTAL SCORE: 14

## 2017-05-16 ASSESSMENT — PATIENT HEALTH QUESTIONNAIRE - PHQ9: SUM OF ALL RESPONSES TO PHQ QUESTIONS 1-9: 12

## 2017-08-07 ENCOUNTER — TELEPHONE (OUTPATIENT)
Dept: FAMILY MEDICINE | Facility: CLINIC | Age: 32
End: 2017-08-07

## 2017-08-07 DIAGNOSIS — F41.0 PANIC ATTACK: ICD-10-CM

## 2017-08-07 NOTE — TELEPHONE ENCOUNTER
Panel Management Review      Patient has the following on his problem list:     Depression / Dysthymia review  PHQ-9 SCORE 6/18/2015 3/7/2017 5/15/2017   Total Score 8 - -   Total Score - 10 12      Patient is due for:  PHQ9        Composite cancer screening  Chart review shows that this patient is due/due soon for the following None  Summary:    Patient is due/failing the following:   PHQ9    Action needed:   Patient needs to do PHQ9.    Type of outreach:    Phone, left message for patient to call back.     Questions for provider review:    None                                                                                                                                    Cristy Puente [facility]       Chart routed to Care Team .

## 2017-08-08 NOTE — TELEPHONE ENCOUNTER
Controlled Substance Refill Request for CLONAZEPAM 0.5MG TABLETS    Problem List Complete:  No     PROVIDER TO CONSIDER COMPLETION OF PROBLEM LIST AND OVERVIEW/CONTROLLED SUBSTANCE AGREEMENT    Last Written Prescription Date:  03/07/17  Last Fill Quantity: 10,   # refills: 0    Last Office Visit with [facility] primary care provider: 05/15/17 Susan Haase    Future Office visit:     Controlled substance agreement on file: No.     Processing:  Fax Rx to [facility] pharmacy     checked in past 6 months?  No, route to RN

## 2017-08-09 RX ORDER — CLONAZEPAM 0.5 MG/1
TABLET ORAL
Qty: 10 TABLET | Refills: 0 | OUTPATIENT
Start: 2017-08-09

## 2017-08-09 NOTE — TELEPHONE ENCOUNTER
Patient calling back and states still has 4 out of 10 left from 3/7/17 RX.  States just likes to have for occasional anxiety attack.  Was given RX for Prozac but his pharmacist friend advised against it due to side effects.  Scheduled for tomorrow at 2:30 pm.  Nuria Medrano RN

## 2017-08-09 NOTE — TELEPHONE ENCOUNTER
Disp Refills Start End PETER   clonazePAM (KLONOPIN) 0.5 MG tablet 10 tablet 0 3/7/2017  --   Sig: Take 0.5-1 tablets (0.25-0.5 mg) by mouth 2 times daily as needed for anxiety   Class: Local Print     Routing refill request to provider for review/approval because:  Drug not on the [facility] refill protocol    8.17.17, only the 1 fill back in March from PCP    Nina Null RN, BS  Clinical Nurse Triage.

## 2017-08-09 NOTE — ASSESSMENT & PLAN NOTE
Patient is followed by HAASE, SUSAN RACHELE for ongoing prescription of benzodiazepines.  All refills should be approved by this provider, or covering partner.    Medication(s): clonazePAM (KLONOPIN) 0.5 MG tablet.   Maximum quantity per month: 10  Clinic visit frequency required: Q 6  months     Controlled substance agreement on file: No  Benzodiazepine use reviewed by psychiatry:  No    Last [facility] website verification:  done on 8.9.17   [email or web address]

## 2017-08-09 NOTE — TELEPHONE ENCOUNTER
This is not a medication that I fill more than once, if he is still having anxiety I would like to see him for a clinic visit and discuss other options.  Thanks,  Susan Haase, CNP

## 2017-08-10 ENCOUNTER — OFFICE VISIT (OUTPATIENT)
Dept: FAMILY MEDICINE | Facility: CLINIC | Age: 32
End: 2017-08-10
Payer: COMMERCIAL

## 2017-08-10 VITALS
RESPIRATION RATE: 12 BRPM | SYSTOLIC BLOOD PRESSURE: 120 MMHG | HEART RATE: 58 BPM | BODY MASS INDEX: 21.56 KG/M2 | DIASTOLIC BLOOD PRESSURE: 60 MMHG | TEMPERATURE: 98.4 F | WEIGHT: 146 LBS

## 2017-08-10 DIAGNOSIS — F40.243 FEAR OF FLYING: ICD-10-CM

## 2017-08-10 DIAGNOSIS — F33.0 MILD RECURRENT MAJOR DEPRESSION (H): Primary | Chronic | ICD-10-CM

## 2017-08-10 DIAGNOSIS — F41.0 PANIC ATTACK: ICD-10-CM

## 2017-08-10 DIAGNOSIS — K21.9 GASTROESOPHAGEAL REFLUX DISEASE WITHOUT ESOPHAGITIS: ICD-10-CM

## 2017-08-10 PROCEDURE — 99214 OFFICE O/P EST MOD 30 MIN: CPT | Performed by: NURSE PRACTITIONER

## 2017-08-10 RX ORDER — CLONAZEPAM 0.5 MG/1
TABLET ORAL
Qty: 10 TABLET | Refills: 0 | Status: SHIPPED | OUTPATIENT
Start: 2017-08-10 | End: 2020-07-09

## 2017-08-10 RX ORDER — HYDROXYZINE HYDROCHLORIDE 25 MG/1
25-50 TABLET, FILM COATED ORAL EVERY 6 HOURS PRN
Qty: 60 TABLET | Refills: 1 | Status: SHIPPED | OUTPATIENT
Start: 2017-08-10 | End: 2020-05-19

## 2017-08-10 ASSESSMENT — PATIENT HEALTH QUESTIONNAIRE - PHQ9
SUM OF ALL RESPONSES TO PHQ QUESTIONS 1-9: 7
5. POOR APPETITE OR OVEREATING: MORE THAN HALF THE DAYS

## 2017-08-10 ASSESSMENT — ANXIETY QUESTIONNAIRES
5. BEING SO RESTLESS THAT IT IS HARD TO SIT STILL: MORE THAN HALF THE DAYS
1. FEELING NERVOUS, ANXIOUS, OR ON EDGE: SEVERAL DAYS
2. NOT BEING ABLE TO STOP OR CONTROL WORRYING: SEVERAL DAYS
GAD7 TOTAL SCORE: 9
6. BECOMING EASILY ANNOYED OR IRRITABLE: SEVERAL DAYS
7. FEELING AFRAID AS IF SOMETHING AWFUL MIGHT HAPPEN: SEVERAL DAYS
3. WORRYING TOO MUCH ABOUT DIFFERENT THINGS: SEVERAL DAYS
IF YOU CHECKED OFF ANY PROBLEMS ON THIS QUESTIONNAIRE, HOW DIFFICULT HAVE THESE PROBLEMS MADE IT FOR YOU TO DO YOUR WORK, TAKE CARE OF THINGS AT HOME, OR GET ALONG WITH OTHER PEOPLE: SOMEWHAT DIFFICULT

## 2017-08-10 NOTE — MR AVS SNAPSHOT
"              After Visit Summary   8/10/2017    Wellington Guaman    MRN: [id number]           Patient Information     Date Of Birth          1985        Visit Information        Provider Department      8/10/2017 2:30 PM Haase, Susan Rachele, APRN CNP [facility]        Today's Diagnoses     Panic attacks    -  1    Fear of flying          Care Instructions    Take 2 tablets of Atarax (hydroxyzine) for panic symptoms, as discussed    Klonopin, for use while flying - 30 minutes prior to takeoff, may take another half tablet if needed            Follow-ups after your visit        Follow-up notes from your care team     Return in about 6 months (around 2/10/2018).      Who to contact     If you have questions or need follow up information about today's clinic visit or your schedule please contact [facility] directly at [phone].  Normal or non-critical lab and imaging results will be communicated to you by HungerTimehart, letter or phone within 4 business days after the clinic has received the results. If you do not hear from us within 7 days, please contact the clinic through HungerTimehart or phone. If you have a critical or abnormal lab result, we will notify you by phone as soon as possible.  Submit refill requests through [employer] or call your pharmacy and they will forward the refill request to us. Please allow 3 business days for your refill to be completed.          Additional Information About Your Visit        MyChart Information     [employer] lets you send messages to your doctor, view your test results, renew your prescriptions, schedule appointments and more. To sign up, go to [email or web address] . Click on \"Log in\" on the left side of the screen, which will take you to the Welcome page. Then click on \"Sign up Now\" on the right side of the page.     You will be asked to enter the access code listed below, as well as some personal information. Please follow the directions " to create your username and password.     Your access code is: [email or web address]  Expires: 2017  3:03 PM     Your access code will  in 90 days. If you need help or a new code, please call your [facility] or [phone].        Care EveryWhere ID     This is your Care EveryWhere ID. This could be used by other organizations to access your [facility] medical records  [id number]        Your Vitals Were     Pulse Temperature Respirations BMI (Body Mass Index)          58 98.4  F (36.9  C) (Oral) 12 21.56 kg/m2         Blood Pressure from Last 3 Encounters:   08/10/17 120/60   05/15/17 120/76   17 114/78    Weight from Last 3 Encounters:   08/10/17 146 lb (66.2 kg)   05/15/17 151 lb (68.5 kg)   17 156 lb (70.8 kg)              Today, you had the following     No orders found for display         Today's Medication Changes          These changes are accurate as of: 8/10/17  3:03 PM.  If you have any questions, ask your nurse or doctor.               These medicines have changed or have updated prescriptions.        Dose/Directions    clonazePAM 0.5 MG tablet   Commonly known as:  klonoPIN   This may have changed:    - how much to take  - how to take this  - when to take this  - reasons to take this  - additional instructions   Used for:  Fear of flying   Changed by:  Haase, Susan Rachele, APRN CNP        Take 1/2 to 1 tablet 30 min prior to flight may repeat once if needed.   Quantity:  10 tablet   Refills:  0            Where to get your medicines      Some of these will need a paper prescription and others can be bought over the counter.  Ask your nurse if you have questions.     Bring a paper prescription for each of these medications     clonazePAM 0.5 MG tablet                Primary Care Provider Office Phone # Fax #    Susan Rachele Haase, APRN [phone] [address] [facility] [address]        Equal Access to Services     CAROL HOLDEN AH: Barbara jameson  Yadira, jefferyda lucaraadaha, qadeniseta kacompa olivares, patrice quach hemanthdiamante laReinachristy suzy. So [facility] [phone].    ATENCIÓN: Si jonathan us, tiene a lopez disposición servicios gratuitos de asistencia lingüística. Jyoti al [phone].    We comply with applicable federal civil rights laws and [place] laws. We do not discriminate on the basis of race, color, national origin, age, disability sex, sexual orientation or gender identity.            Thank you!     Thank you for choosing [facility]  for your care. Our goal is always to provide you with excellent care. Hearing back from our patients is one way we can continue to improve our services. Please take a few minutes to complete the written survey that you may receive in the mail after your visit with us. Thank you!             Your Updated Medication List - Protect others around you: Learn how to safely use, store and throw away your medicines at www.disposemymeds.org.          This list is accurate as of: 8/10/17  3:03 PM.  Always use your most recent med list.                   Brand Name Dispense Instructions for use Diagnosis    clonazePAM 0.5 MG tablet    klonoPIN    10 tablet    Take 1/2 to 1 tablet 30 min prior to flight may repeat once if needed.    Fear of flying       omeprazole 20 MG CR capsule    priLOSEC    30 capsule    Take 1 capsule (20 mg) by mouth daily    Gastroesophageal reflux disease with esophagitis

## 2017-08-10 NOTE — PATIENT INSTRUCTIONS
Take 2 tablets of Atarax (hydroxyzine) for panic symptoms, as discussed    Klonopin, for use while flying - 30 minutes prior to takeoff, may take another half tablet if needed

## 2017-08-10 NOTE — PROGRESS NOTES
"  SUBJECTIVE:                                                    Wellington Guaman is a 32 year old male who presents to clinic today for the following health issues:    Depression and Anxiety Follow-Up    Status since last visit: Improved - \"so-so\"    Other associated symptoms:None    Complicating factors:     Significant life event: No     Current substance abuse: None    Klonopin 0.25 mg up to twice daily as needed for panic symptoms, he reported having 1-2 panic attacks a month. Driving is a trigger for these panic attacks. He reported he will be going on a trip soon, and is anxious about flying and has had panic attacks in the past.     He reported trying a daily mood medication when he was in high school. He stated that his symptoms improved with this therapy, but he did not like the side effects of feeling \"tired and dizzy\".     He reported that his sleep has improved with use of Sleepy time extra tea and melatonin supplement.   PHQ-9: 7, denies thoughts of harming self or others.  JOSEE-7: 9    Heartburn/Nausea -- He stated that his stomach issues haven't changed much. He tried omeprazole, didn't feel any improvement and quit. He started taking it again yesterday. He stated his symptoms haven't been \"too bad\" really.       Amount of exercise or physical activity: 6-7 days/week for an average of 45-60 minutes    Problems taking medications regularly: No    Medication side effects: none    Diet: regular (no restrictions)    Problem list and histories reviewed & adjusted, as indicated.  Additional history: as documented    Reviewed and updated as needed this visit by clinical staffTobacco  Allergies  Med Hx  Surg Hx  Fam Hx  Soc Hx      Reviewed and updated as needed this visit by Provider         ROS:  Constitutional, HEENT, cardiovascular, pulmonary, GI, , musculoskeletal, neuro, skin, endocrine and psych systems are negative, except as otherwise noted.    This document serves as a record of the " services and decisions personally performed and made by Susan Haase, CNP. It was created on her behalf by Joseph Aponte, a trained medical scribe. The creation of this document is based the provider's statements to the medical scribe.  Joseph Aponte August 10, 2017 2:46 PM    OBJECTIVE:   /60 (BP Location: Right arm, Patient Position: Chair, Cuff Size: Adult Regular)  Pulse 58  Temp 98.4  F (36.9  C) (Oral)  Resp 12  Wt 66.2 kg (146 lb)  BMI 21.56 kg/m2  Body mass index is 21.56 kg/(m^2).  GENERAL: healthy, alert and no distress  RESP: lungs clear to auscultation - no rales, rhonchi or wheezes  CV: regular rate and rhythm, normal S1 S2, no S3 or S4, no murmur, click or rub, no peripheral edema  PSYCH: mentation appears normal, affect normal/bright    ASSESSMENT/PLAN:   Wellington was seen today for anxiety.    Diagnoses and all orders for this visit:    Mild recurrent major depression since 2006:  Well controlled PHQ 9 score of 7.    Panic attacks:  Discussed daily medication which he declined starting, would prefer to take something only on an as needed basis.  Is aware of addictive properties of benzodiazepines, will try hydroxyzine.   -     hydroxyzine (ATARAX) 25 MG tablet; Take 1-2 tablets (25-50 mg) by mouth every 6 hours as needed for anxiety    Fear of flying: will only prescribe the below for flying, will need an office visit for any refills.   -     clonazePAM (KLONOPIN) 0.5 MG tablet; Take 1/2 to 1 tablet 30 min prior to flight may repeat once if needed, do NOT drive when taking.    Gastroesophageal reflux disease without esophagitis:  Continue omeprazole 20 mg on prn basis.       Patient Instructions   Take 2 tablets of Atarax (hydroxyzine) for panic symptoms, as discussed    Klonopin, for use while flying - 30 minutes prior to takeoff, may take another half tablet if needed      The information in this document, created by the medical scribe for me, accurately reflects the services I personally  performed and the decisions made by me. I have reviewed and approved this document for accuracy.   Susan Haase, CNP Susan Haase, APRN CNP  [facility]

## 2017-08-11 ASSESSMENT — ANXIETY QUESTIONNAIRES: GAD7 TOTAL SCORE: 9

## 2019-10-24 ENCOUNTER — OFFICE VISIT (OUTPATIENT)
Dept: FAMILY MEDICINE | Facility: CLINIC | Age: 34
End: 2019-10-24

## 2019-10-24 VITALS
OXYGEN SATURATION: 98 % | WEIGHT: 168.5 LBS | RESPIRATION RATE: 16 BRPM | DIASTOLIC BLOOD PRESSURE: 80 MMHG | BODY MASS INDEX: 25.54 KG/M2 | HEART RATE: 81 BPM | HEIGHT: 68 IN | SYSTOLIC BLOOD PRESSURE: 120 MMHG

## 2019-10-24 DIAGNOSIS — Z23 NEED FOR PROPHYLACTIC VACCINATION AND INOCULATION AGAINST INFLUENZA: ICD-10-CM

## 2019-10-24 DIAGNOSIS — Z23 NEED FOR VACCINATION: ICD-10-CM

## 2019-10-24 DIAGNOSIS — K21.9 GASTROESOPHAGEAL REFLUX DISEASE WITHOUT ESOPHAGITIS: Primary | ICD-10-CM

## 2019-10-24 PROCEDURE — 90471 IMMUNIZATION ADMIN: CPT | Performed by: FAMILY MEDICINE

## 2019-10-24 PROCEDURE — 90686 IIV4 VACC NO PRSV 0.5 ML IM: CPT | Performed by: FAMILY MEDICINE

## 2019-10-24 PROCEDURE — 90472 IMMUNIZATION ADMIN EACH ADD: CPT | Performed by: FAMILY MEDICINE

## 2019-10-24 PROCEDURE — 99202 OFFICE O/P NEW SF 15 MIN: CPT | Mod: 25 | Performed by: FAMILY MEDICINE

## 2019-10-24 PROCEDURE — 90715 TDAP VACCINE 7 YRS/> IM: CPT | Performed by: FAMILY MEDICINE

## 2019-10-24 ASSESSMENT — MIFFLIN-ST. JEOR: SCORE: 1683.81

## 2019-10-24 NOTE — PROGRESS NOTES
"Patient needs vaccines    otw healthy  Has mild GERD    Having a baby in the next week or two   Non smoker    /80   Pulse 81   Resp 16   Ht 1.727 m (5' 8\")   Wt 76.4 kg (168 lb 8 oz)   SpO2 98%   BMI 25.62 kg/m      GENERAL: healthy, alert and no distress  EYES: Eyes grossly normal to inspection, PERRL and conjunctivae and sclerae normal  HENT: ear canals and TM's normal, nose and mouth without ulcers or lesions  NECK: no adenopathy, no asymmetry, masses, or scars and thyroid normal to palpation  RESP: lungs clear to auscultation - no rales, rhonchi or wheezes  CV: regular rate and rhythm, normal S1 S2,   MS: no gross musculoskeletal defects noted, no edema  SKIN: no suspicious lesions or rashes  NEURO: Normal strength and tone, mentation intact and speech normal  PSYCH: mentation appears normal, affect normal/bright    ASSESSMENT:  1. Need for vaccination    - TDAP VACCINE (BOOSTRIX) [53776]  - INFLUENZA VACCINE IM 4YRS+ 4 VALENT CCIIV4    2. Need for prophylactic vaccination and inoculation against influenza    - INFLUENZA VACCINE IM 4YRS+ 4 VALENT CCIIV4     3. GERD  cw currernt med  "

## 2020-05-19 ENCOUNTER — OFFICE VISIT (OUTPATIENT)
Dept: FAMILY MEDICINE | Facility: CLINIC | Age: 35
End: 2020-05-19

## 2020-05-19 VITALS
HEIGHT: 68 IN | SYSTOLIC BLOOD PRESSURE: 114 MMHG | WEIGHT: 165 LBS | TEMPERATURE: 98.8 F | DIASTOLIC BLOOD PRESSURE: 72 MMHG | BODY MASS INDEX: 25.01 KG/M2 | HEART RATE: 103 BPM | RESPIRATION RATE: 16 BRPM | OXYGEN SATURATION: 99 %

## 2020-05-19 DIAGNOSIS — Z00.00 ROUTINE GENERAL MEDICAL EXAMINATION AT A HEALTH CARE FACILITY: Primary | ICD-10-CM

## 2020-05-19 DIAGNOSIS — F41.0 PANIC DISORDER WITHOUT AGORAPHOBIA: ICD-10-CM

## 2020-05-19 DIAGNOSIS — F51.02 ADJUSTMENT INSOMNIA: ICD-10-CM

## 2020-05-19 DIAGNOSIS — F41.1 GAD (GENERALIZED ANXIETY DISORDER): ICD-10-CM

## 2020-05-19 LAB
% GRANULOCYTES: 63.4 % (ref 42.2–75.2)
HCT VFR BLD AUTO: 44 % (ref 39–51)
HEMOGLOBIN: 15.1 G/DL (ref 13.4–17.5)
LYMPHOCYTES NFR BLD AUTO: 29.2 % (ref 20.5–51.1)
MCH RBC QN AUTO: 29.2 PG (ref 27–31)
MCHC RBC AUTO-ENTMCNC: 34.4 G/DL (ref 33–37)
MCV RBC AUTO: 84.7 FL (ref 80–100)
MONOCYTES NFR BLD AUTO: 7.4 % (ref 1.7–9.3)
PLATELET # BLD AUTO: 421 K/UL (ref 140–450)
RBC # BLD AUTO: 5.19 X10/CMM (ref 4.2–5.9)
WBC # BLD AUTO: 6.3 X10/CMM (ref 3.8–11)

## 2020-05-19 PROCEDURE — 36415 COLL VENOUS BLD VENIPUNCTURE: CPT | Performed by: FAMILY MEDICINE

## 2020-05-19 PROCEDURE — 99395 PREV VISIT EST AGE 18-39: CPT | Performed by: FAMILY MEDICINE

## 2020-05-19 PROCEDURE — 84443 ASSAY THYROID STIM HORMONE: CPT | Mod: 90 | Performed by: FAMILY MEDICINE

## 2020-05-19 PROCEDURE — 85025 COMPLETE CBC W/AUTO DIFF WBC: CPT | Performed by: FAMILY MEDICINE

## 2020-05-19 PROCEDURE — 80053 COMPREHEN METABOLIC PANEL: CPT | Mod: 90 | Performed by: FAMILY MEDICINE

## 2020-05-19 PROCEDURE — 80061 LIPID PANEL: CPT | Mod: 90 | Performed by: FAMILY MEDICINE

## 2020-05-19 PROCEDURE — 99214 OFFICE O/P EST MOD 30 MIN: CPT | Mod: 25 | Performed by: FAMILY MEDICINE

## 2020-05-19 RX ORDER — CLONAZEPAM 0.5 MG/1
0.5 TABLET ORAL
Qty: 60 TABLET | Refills: 0 | Status: SHIPPED | OUTPATIENT
Start: 2020-05-19 | End: 2021-08-11

## 2020-05-19 ASSESSMENT — ANXIETY QUESTIONNAIRES
2. NOT BEING ABLE TO STOP OR CONTROL WORRYING: NEARLY EVERY DAY
6. BECOMING EASILY ANNOYED OR IRRITABLE: NEARLY EVERY DAY
5. BEING SO RESTLESS THAT IT IS HARD TO SIT STILL: NEARLY EVERY DAY
IF YOU CHECKED OFF ANY PROBLEMS ON THIS QUESTIONNAIRE, HOW DIFFICULT HAVE THESE PROBLEMS MADE IT FOR YOU TO DO YOUR WORK, TAKE CARE OF THINGS AT HOME, OR GET ALONG WITH OTHER PEOPLE: VERY DIFFICULT
1. FEELING NERVOUS, ANXIOUS, OR ON EDGE: NEARLY EVERY DAY
GAD7 TOTAL SCORE: 20
3. WORRYING TOO MUCH ABOUT DIFFERENT THINGS: MORE THAN HALF THE DAYS
7. FEELING AFRAID AS IF SOMETHING AWFUL MIGHT HAPPEN: NEARLY EVERY DAY

## 2020-05-19 ASSESSMENT — PATIENT HEALTH QUESTIONNAIRE - PHQ9
SUM OF ALL RESPONSES TO PHQ QUESTIONS 1-9: 15
5. POOR APPETITE OR OVEREATING: NEARLY EVERY DAY

## 2020-05-19 ASSESSMENT — MIFFLIN-ST. JEOR: SCORE: 1658.97

## 2020-05-19 NOTE — PROGRESS NOTES
3  SUBJECTIVE:   CC: Wellington Guaman is an 35 year old male who presents for preventive health visit.     He is feeling very overwhelmed currently.  Had first child (daughter) about 6 months ago.  About 4 months ago increased anxiety.  Feels exhausted but trouble sleeping.  Having trouble focusing for his job.  Notices occasional palpitations, diaphoresis.  Triggers include driving.  Denies manic symptoms.  Denies depression or thoughts of self harm.  Was on sertraline in the past but caused some nausea.  Also adverse effects noted on citalopram.  Was previously on clonazepam as needed which was helpful.  Hydroxyzine prn caused sedation.  Not on any meds currently.  Drinking 1-2 alcoholic drinks per day but wants to cut back.  No caffeine or drugs.  Used t olike exercise but not able to due to how he feels.  No personal or fam hx of cardiac issues.     Healthy Habits:    Do you get at least three servings of calcium containing foods daily (dairy, green leafy vegetables, etc.)? yes    Amount of exercise or daily activities, outside of work: 2 day(s) per week    Problems taking medications regularly No    Medication side effects: No    Have you had an eye exam in the past two years? yes    Do you see a dentist twice per year? no    Do you have sleep apnea, excessive snoring or daytime drowsiness?daytime drowsiness    Today's PHQ-2 Score:   PHQ-2 ( 1999 Pfizer) 8/10/2017 5/15/2017   Q1: Little interest or pleasure in doing things 1 1   Q2: Feeling down, depressed or hopeless 1 1   PHQ-2 Score 2 2       Abuse: Current or Past(Physical, Sexual or Emotional)- No  Do you feel safe in your environment? Yes        Social History     Tobacco Use     Smoking status: Never Smoker     Smokeless tobacco: Never Used   Substance Use Topics     Alcohol use: Yes     Comment: 4 beers/wk     If you drink alcohol do you typically have >3 drinks per day or >7 drinks per week? No                        Reviewed and updated as needed  this visit by clinical staff  Tobacco  Allergies  Meds       Past Medical History:   Diagnosis Date     Agoraphobia      Agoraphobia 4/26/2012     Anxiety      CARDIOVASCULAR SCREENING; LDL GOAL LESS THAN 160 4/26/2012        ROS:  CONSTITUTIONAL: NEGATIVE for fever, chills, change in weight  INTEGUMENTARY/SKIN: NEGATIVE for worrisome rashes, moles or lesions  EYES: NEGATIVE for vision changes or irritation  ENT: NEGATIVE for ear, mouth and throat problems  RESP: NEGATIVE for significant cough or SOB  CV: NEGATIVE for chest pain, palpitations or peripheral edema  GI: NEGATIVE for nausea, abdominal pain, heartburn, or change in bowel habits   male: negative for dysuria, hematuria, decreased urinary stream, erectile dysfunction, urethral discharge  MUSCULOSKELETAL: NEGATIVE for significant arthralgias or myalgia  NEURO: NEGATIVE for weakness, dizziness or paresthesias    OBJECTIVE:   There were no vitals taken for this visit.  EXAM:  GENERAL: healthy, alert and no distress  EYES: Eyes grossly normal to inspection, PERRL and conjunctivae and sclerae normal  HENT: ear canals and TM's normal, nose and mouth without ulcers or lesions  NECK: no adenopathy, no asymmetry, masses, or scars and thyroid normal to palpation  RESP: lungs clear to auscultation - no rales, rhonchi or wheezes  CV: regular rate and rhythm, normal S1 S2, no S3 or S4, no murmur, click or rub, no peripheral edema and peripheral pulses strong  ABDOMEN: soft, nontender, no hepatosplenomegaly, no masses and bowel sounds normal   (male): normal male genitalia without lesions or urethral discharge, no hernia  MS: no gross musculoskeletal defects noted, no edema  SKIN: no suspicious lesions or rashes  NEURO: Normal strength and tone, mentation intact and speech normal  PSYCH: mentation appears normal, anxious and somewhat poor eye contact.  Normal thought process, judgment and insight      ASSESSMENT/PLAN:   1. Routine general medical examination at a  health care facility  - Lipid Panel ([facility])  - Comp. Metabolic Panel (14) ([facility])    2. Panic disorder without agoraphobia: lengthy discussion.  After going over all options will start low dose fluoxetine, scheduled clonazepam with plan to taper after 4-6 weeks, psychology consultation and close follow up  - CBC with Diff/Plt ([facility])  - Comp. Metabolic Panel (14) ([facility])  - TSH ([facility])  - clonazePAM (KLONOPIN) 0.5 MG tablet; Take 1 tablet (0.5 mg) by mouth 2 times daily  Dispense: 60 tablet; Refill: 0  - FLUoxetine (PROZAC) 20 MG capsule; Take 1 capsule (20 mg) by mouth daily  Dispense: 30 capsule; Refill: 0  - MENTAL HEALTH REFERRAL  - Adult; Outpatient Treatment; Individual/Couples/Family/Group Therapy/Health Psychology; [facility] [phone]; We will contact you to schedule the appointment or please call with any questions    3. JOSEE (generalized anxiety disorder)  - see above  - Comp. Metabolic Panel (14) ([facility])  - TSH ([facility])  - clonazePAM (KLONOPIN) 0.5 MG tablet; Take 1 tablet (0.5 mg) by mouth 2 times daily  Dispense: 60 tablet; Refill: 0  - FLUoxetine (PROZAC) 20 MG capsule; Take 1 capsule (20 mg) by mouth daily  Dispense: 30 capsule; Refill: 0  - MENTAL HEALTH REFERRAL  - Adult; Outpatient Treatment; Individual/Couples/Family/Group Therapy/Health Psychology; [facility] [phone]; We will contact you to schedule the appointment or please call with any questions    4. Adjustment insomnia  - Comp. Metabolic Panel (14) ([facility])  - TSH ([facility])    Patient is agreement with the assessment and plan as outlined above.  All questions answered.  Red flag symptoms that should prompt emergent evaluation discussed and understood.      COUNSELING:  Reviewed preventive health counseling, as reflected in patient instructions       Regular exercise       Healthy diet/nutrition    Estimated body mass index is 25.62 kg/m  as calculated from the following:     "Height as of 10/24/19: 1.727 m (5' 8\").    Weight as of 10/24/19: 76.4 kg (168 lb 8 oz).         reports that he has never smoked. He has never used smokeless tobacco.      Counseling Resources:  ATP IV Guidelines  Pooled Cohorts Equation Calculator  FRAX Risk Assessment  ICSI Preventive Guidelines  Dietary Guidelines for Americans, 2010  USDA's MyPlate  ASA Prophylaxis  Lung CA Screening    Wyatt Barrow MD  [facility]  "

## 2020-05-19 NOTE — LETTER
[facility]  [address] [place]  [address]  Phone: [phone]    May 22, 2020      Wellington Guaman  [address]  [place] [facility] [place] [place] [address]              Dear Wellington,    Your lab results are all normal which is great.  I did receive a message that the mental health office was not able to reach you.  If you need help scheduling an appointment please let our office know.       It was nice meeting you.     Sincerely,     Wyatt Barrow MD     Results for orders placed or performed in visit on 05/19/20   CBC with Diff/Plt ([facility])     Status: None   Result Value Ref Range    WBC x10/cmm 6.3 3.8 - 11.0 x10/cmm    % Lymphocytes 29.2 20.5 - 51.1 %    % Monocytes 7.4 1.7 - 9.3 %    % Granulocytes 63.4 42.2 - 75.2 %    RBC x10/cmm 5.19 4.2 - 5.9 x10/cmm    Hemoglobin 15.1 13.4 - 17.5 g/dl    Hematocrit 44.0 39 - 51 %    MCV 84.7 80 - 100 fL    MCH 29.2 27.0 - 31.0 pg    MCHC 34.4 33.0 - 37.0 g/dL    Platelet Count 421 140 - 450 K/uL   Lipid Panel ([facility])     Status: Abnormal   Result Value Ref Range    Cholesterol 198 100 - 199 mg/dL    Triglycerides 76 0 - 149 mg/dL    HDL Cholesterol 54 >39 mg/dL    VLDL Cholesterol Claudio 15 5 - 40 mg/dL    LDL Cholesterol Calculated 129 (H) 0 - 99 mg/dL    LDL/HDL Ratio 2.4 0.0 - 3.6 ratio    Narrative    Performed at:  01 - [facility] [address], [place], [place]  [id number]  : Valentin Pearce MD, Phone:  [phone]   Comp. Metabolic Panel (14) ([facility])     Status: None   Result Value Ref Range    Glucose 93 65 - 99 mg/dL    Urea Nitrogen 14 6 - 20 mg/dL    Creatinine 0.91 0.76 - 1.27 mg/dL    eGFR If NonAfricn Am 110 >59 mL/min/1.73    eGFR If Africn Am 127 >59 mL/min/1.73    BUN/Creatinine Ratio 15 9 - 20    Sodium 138 134 - 144 mmol/L    Potassium 4.7 3.5 - 5.2 mmol/L    Chloride 103 96 - 106 mmol/L    Total CO2 25 20 - 29 mmol/L    Calcium 9.7 8.7 - 10.2 mg/dL    Protein Total 7.5 6.0 - 8.5 g/dL    Albumin 4.7 4.0 - 5.0 g/dL    Globulin,  Total 2.8 1.5 - 4.5 g/dL    A/G Ratio 1.7 1.2 - 2.2    Bilirubin Total 0.6 0.0 - 1.2 mg/dL    Alkaline Phosphatase 79 39 - 117 IU/L    AST 17 0 - 40 IU/L    ALT 23 0 - 44 IU/L    Narrative    Performed at:  01 - [facility] [address], [place], [place]  [id number]  : Valentin Pearce MD, Phone:  [phone]   TSH ([facility])     Status: None   Result Value Ref Range    TSH 1.960 0.450 - 4.500 uIU/mL    Narrative    Performed at:  01 - [facility] [address], [place], [place]  [id number]  : Valentin Pearce MD, Phone:  [phone]

## 2020-05-20 LAB
ALBUMIN SERPL-MCNC: 4.7 G/DL (ref 4–5)
ALBUMIN/GLOB SERPL: 1.7 {RATIO} (ref 1.2–2.2)
ALP SERPL-CCNC: 79 IU/L (ref 39–117)
ALT SERPL-CCNC: 23 IU/L (ref 0–44)
AST SERPL-CCNC: 17 IU/L (ref 0–40)
BILIRUB SERPL-MCNC: 0.6 MG/DL (ref 0–1.2)
BUN SERPL-MCNC: 14 MG/DL (ref 6–20)
BUN/CREATININE RATIO: 15 (ref 9–20)
CALCIUM SERPL-MCNC: 9.7 MG/DL (ref 8.7–10.2)
CHLORIDE SERPLBLD-SCNC: 103 MMOL/L (ref 96–106)
CHOLEST SERPL-MCNC: 198 MG/DL (ref 100–199)
CREAT SERPL-MCNC: 0.91 MG/DL (ref 0.76–1.27)
EGFR IF AFRICN AM: 127 ML/MIN/1.73
EGFR IF NONAFRICN AM: 110 ML/MIN/1.73
GLOBULIN, TOTAL: 2.8 G/DL (ref 1.5–4.5)
GLUCOSE SERPL-MCNC: 93 MG/DL (ref 65–99)
HDLC SERPL-MCNC: 54 MG/DL
LDL/HDL RATIO: 2.4 RATIO (ref 0–3.6)
LDLC SERPL CALC-MCNC: 129 MG/DL (ref 0–99)
POTASSIUM SERPL-SCNC: 4.7 MMOL/L (ref 3.5–5.2)
PROT SERPL-MCNC: 7.5 G/DL (ref 6–8.5)
SODIUM SERPL-SCNC: 138 MMOL/L (ref 134–144)
TOTAL CO2: 25 MMOL/L (ref 20–29)
TRIGL SERPL-MCNC: 76 MG/DL (ref 0–149)
TSH BLD-ACNC: 1.96 UIU/ML (ref 0.45–4.5)
VLDLC SERPL CALC-MCNC: 15 MG/DL (ref 5–40)

## 2020-05-20 ASSESSMENT — ANXIETY QUESTIONNAIRES: GAD7 TOTAL SCORE: 20

## 2020-07-09 ENCOUNTER — VIRTUAL VISIT (OUTPATIENT)
Dept: FAMILY MEDICINE | Facility: CLINIC | Age: 35
End: 2020-07-09

## 2020-07-09 DIAGNOSIS — F32.0 MILD MAJOR DEPRESSION (H): ICD-10-CM

## 2020-07-09 DIAGNOSIS — F41.1 GAD (GENERALIZED ANXIETY DISORDER): Primary | ICD-10-CM

## 2020-07-09 DIAGNOSIS — R41.840 DIFFICULTY CONCENTRATING: ICD-10-CM

## 2020-07-09 DIAGNOSIS — R05.9 COUGH: ICD-10-CM

## 2020-07-09 PROCEDURE — 99214 OFFICE O/P EST MOD 30 MIN: CPT | Mod: GT | Performed by: NURSE PRACTITIONER

## 2020-07-09 RX ORDER — ESCITALOPRAM OXALATE 10 MG/1
TABLET ORAL
Qty: 21 TABLET | Refills: 0 | Status: SHIPPED | OUTPATIENT
Start: 2020-07-09 | End: 2020-08-10

## 2020-07-09 ASSESSMENT — PATIENT HEALTH QUESTIONNAIRE - PHQ9
SUM OF ALL RESPONSES TO PHQ QUESTIONS 1-9: 9
5. POOR APPETITE OR OVEREATING: MORE THAN HALF THE DAYS

## 2020-07-09 ASSESSMENT — ANXIETY QUESTIONNAIRES
3. WORRYING TOO MUCH ABOUT DIFFERENT THINGS: SEVERAL DAYS
2. NOT BEING ABLE TO STOP OR CONTROL WORRYING: SEVERAL DAYS
IF YOU CHECKED OFF ANY PROBLEMS ON THIS QUESTIONNAIRE, HOW DIFFICULT HAVE THESE PROBLEMS MADE IT FOR YOU TO DO YOUR WORK, TAKE CARE OF THINGS AT HOME, OR GET ALONG WITH OTHER PEOPLE: SOMEWHAT DIFFICULT
7. FEELING AFRAID AS IF SOMETHING AWFUL MIGHT HAPPEN: SEVERAL DAYS
5. BEING SO RESTLESS THAT IT IS HARD TO SIT STILL: MORE THAN HALF THE DAYS
GAD7 TOTAL SCORE: 10
6. BECOMING EASILY ANNOYED OR IRRITABLE: MORE THAN HALF THE DAYS
1. FEELING NERVOUS, ANXIOUS, OR ON EDGE: SEVERAL DAYS

## 2020-07-09 NOTE — PROGRESS NOTES
Problem(s) Oriented visit      Video-Visit Details    Type of service:  Video Visit    Video Start Time (time video started): 0930    Video End Time (time video stopped): 1007    Originating Location (pt. Location): Home    Distant Location (provider location):  home    Mode of Communication:  Video Conference via FaceTime    Physician has received verbal consent for a Video Visit from the patient? Yes      DARYL Abarca CNP        SUBJECTIVE:                                                    Wellington Guaman is a 35 year old male who presents to clinic today for the following health issues :    Anxiety/depression follow up:    Was seen mid-May for physical with Dr. Barrow and started on fluoxetine and clonazepam for anxiety with panic attacks. He does not feel fluoxetine was working well and stopped this about a week ago. States his anxiety was significant due to health concerns, but this improved after seeing normal lab results. Side effects from fluoxetine were not bad, but had a lot of strange and stressful dreams which was hard to deal with. Baseline anxiety has gone down but he has been exercising a lot more. Had one panic attack since seeing Dr. Barrow, took clonazepam once which was helpful.     Previous antidepressants: sertraline and citalopram, caused GI disturbances    Baby girl (Janet) 8mos old today, wakes up once per night, getting at least a four hour stretch every night. He goes to bed at 9 or 10, wakes up at 4 because of the baby, then wakes up at 6. Exercises for a little while, chips away at his to do list. Gets overwhelmed by the amount of things to do. Starts work at 1, sits on a computer until 9. 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 Drinking a lot of water, usually eats leftovers for lunch, yesterday had sausage potatoes and eggs for lunch, burgers and pasta and beans for dinner. Usually does a green smoothie with protein in it for breakfast.  Drinking has gone down a lot since having Janet. In May he had been having a drink during the day to focus because he feels scatter brained. Stopped drinking during the day. Not drinking at all. No tobacco, marijuana, or other drugs. Green tea once or twice per week.    Dx with ADHD in high school, off treatment since his early 20s. Feels this has worsened since having a child, now having difficulty concentrating at work and it is impacting his job and his marriage. Feels fluoxetine worsened this because of lack of anxiety. Has a hard time starting things and completing them. Makes lists and feels overwhelmed then by this list.       JOSEE-7 SCORE 8/10/2017 5/19/2020 7/9/2020   Total Score - - -   Total Score 9 20 10       PHQ 8/10/2017 5/19/2020 7/9/2020   PHQ-9 Total Score 7 15 9   Q9: Thoughts of better off dead/self-harm past 2 weeks Not at all Several days Not at all       Also notes a runny nose, congestion, and a mild cough this past week- this is not really bothersome to him, but his wife is a childcare provider and they have a young daughter. Wife had COVID testing but results are pending.    Problem list, Medication list, Allergies, and Medical/Social/Surgical histories reviewed in EPIC and updated as appropriate.   Additional history: as documented    ROS:  Gen, HEENT, psych negative except as listed per HPI    Histories:   Patient Active Problem List   Diagnosis     Hyperlipidemia with target LDL less than 130     Mild recurrent major depression since 2006      Panic attacks     Tachycardia     PVC's (premature ventricular contractions)     Fear of flying     Gastroesophageal reflux disease without esophagitis     Panic disorder without agoraphobia     JOSEE (generalized anxiety disorder)     Past Surgical History:   Procedure Laterality Date     NO HISTORY OF SURGERY         Social History     Tobacco Use     Smoking status: Never Smoker     Smokeless tobacco: Never Used   Substance Use Topics     Alcohol  use: Yes     Comment: 4 beers/wk     Family History   Problem Relation Age of Onset     Family History Negative Mother      Depression Mother      Family History Negative Father      Cerebrovascular Disease Other          Current Outpatient Medications   Medication Sig Dispense Refill     clonazePAM (KLONOPIN) 0.5 MG tablet Take 1 tablet (0.5 mg) by mouth 2 times daily 60 tablet 0     FLUoxetine (PROZAC) 20 MG capsule TAKE 1 CAPSULE(20 MG) BY MOUTH DAILY (Patient not taking: Reported on 7/9/2020) 30 capsule 0       OBJECTIVE:                                                    No vitals taken- virtual visit  Constitutional: Alert and oriented non-toxic appearing male in no acute distress  Resp: Respirations unlabored  Psych: Pleasant and interactive, affect euthymic, makes appropriate eye contact, answers questions appropriately, thought content logical       ASSESSMENT/PLAN:                                                        Wellington was seen today for med check.    Diagnoses and all orders for this visit:    JOSEE (generalized anxiety disorder)/Mild major depression (H)  -     escitalopram (LEXAPRO) 10 MG tablet; Take 0.5 tablets (5 mg) by mouth daily for 14 days, THEN 1 tablet (10 mg) daily for 14 days.    Generally improved with anxiety, panic, and depression since his last visit. Fluoxetine was helpful but caused stressful dreams, therefore he stopped this. Will trial a small dose of escitalopram and gradually ramp up dose pending tolerance. Commended on his lifestyle changes. Encouraged him to call his insurance company and inquire where he can have counseling as counseling through [facility] was cost prohibitive. Discussed books on anxiety and meditation apps as well. Reviewed side effects of escitalopram and when to seek further care. Reviewed sparing use of clonazepam and risks associated with this including dependence. Return to clinic in one month for recheck.    Difficulty concentrating  -     MENTAL  HEALTH REFERRAL  - Adult; Assessments and Testing; ADHD; [facility]: [facility] [phone]; We will contact you to schedule the appointment or please call with any questions    Discussed that this could be due to inadequately controlled anxiety and depression, though this has been a longstanding issue for him. Reviewed that stimulants may worsen his anxiety as well and that treatment of ADHD may impact his anxiety of which he verbalized understanding. He will follow up for formal adult ADHD testing.    Cough  -     Symptomatic COVID-19 Virus (Coronavirus) by PCR; Future    Generally feeling okay, but having rhinorrhea and a cough, wife is a childcare provider with similar symptoms. COVID testing ordered.    Patient needs assistance with ADLs: none identified today  Patient needs assistance with iADLs: none identified today    The following health maintenance items are reviewed in Epic and correct as of today:  Health Maintenance   Topic Date Due     HIV SCREENING  05/02/2001     INFLUENZA VACCINE (1) 09/01/2020     PHQ-9  11/19/2020     PREVENTIVE CARE VISIT  05/19/2021     DTAP/TDAP/TD IMMUNIZATION (4 - Td) 10/24/2029     DEPRESSION ACTION PLAN  Completed     IPV IMMUNIZATION  Aged Out     MENINGITIS IMMUNIZATION  Aged Out       This was a virtual video-visit conducted during COVID-19 outbreak in regulation with social distancing and quarantine recommendations of the CDC and [place] department of health and human services. A two way audio/video connection was used in real time with patient's consent.    >25 minutes face to face time spent with patient, over which >50% was spent in counseling, education, and coordination of care.    DARYL Abarca CNP  [facility]  [facility]  [phone]    For any issues my office # is [phone]

## 2020-07-10 ASSESSMENT — ANXIETY QUESTIONNAIRES: GAD7 TOTAL SCORE: 10

## 2020-08-14 ENCOUNTER — TELEPHONE (OUTPATIENT)
Dept: FAMILY MEDICINE | Facility: CLINIC | Age: 35
End: 2020-08-14

## 2020-08-14 NOTE — TELEPHONE ENCOUNTER
Patient called clinic with update on escitalopram. Patient saw Chloé BRITO CNP for virtual visit on 79/20 to address anxiety and depression, patient had discontinued fluoxetine on his own 1 week prior to office visit. He was started on escitalopram, he started at 2.5mg(1/4 tab) and then decreased to 1.25mg(1/8 tab) as he was experiencing side effects. He reports that mood and anxiety are improved but is wondering if he should discontinue escitalopram as he is taking such a small dose. Advised to continue taking though weekend and would discuss with Chloé when she is in clinic on Monday. Wellington does have appointment with psych scheduled later this month though he is planning to cancel due to financial concerns, he declined office visit with Chloé on Monday to discuss-also for financial concerns. Call routed to Chloé for review. Colette Waters

## 2020-08-19 ENCOUNTER — TELEPHONE (OUTPATIENT)
Dept: PSYCHOLOGY | Facility: CLINIC | Age: 35
End: 2020-08-19

## 2020-09-14 DIAGNOSIS — F41.1 GAD (GENERALIZED ANXIETY DISORDER): ICD-10-CM

## 2020-09-14 RX ORDER — ESCITALOPRAM OXALATE 5 MG/1
5 TABLET ORAL DAILY
Qty: 90 TABLET | Refills: 0 | Status: SHIPPED | OUTPATIENT
Start: 2020-09-14 | End: 2021-08-11

## 2021-01-14 ENCOUNTER — HEALTH MAINTENANCE LETTER (OUTPATIENT)
Age: 36
End: 2021-01-14

## 2021-06-01 ENCOUNTER — TRANSFERRED RECORDS (OUTPATIENT)
Dept: FAMILY MEDICINE | Facility: CLINIC | Age: 36
End: 2021-06-01

## 2021-07-04 ENCOUNTER — HEALTH MAINTENANCE LETTER (OUTPATIENT)
Age: 36
End: 2021-07-04

## 2021-08-11 ENCOUNTER — TRANSFERRED RECORDS (OUTPATIENT)
Dept: FAMILY MEDICINE | Facility: CLINIC | Age: 36
End: 2021-08-11

## 2021-08-11 ENCOUNTER — OFFICE VISIT (OUTPATIENT)
Dept: FAMILY MEDICINE | Facility: CLINIC | Age: 36
End: 2021-08-11

## 2021-08-11 VITALS
HEIGHT: 68 IN | DIASTOLIC BLOOD PRESSURE: 82 MMHG | RESPIRATION RATE: 18 BRPM | OXYGEN SATURATION: 98 % | BODY MASS INDEX: 26.34 KG/M2 | SYSTOLIC BLOOD PRESSURE: 132 MMHG | TEMPERATURE: 98.4 F | HEART RATE: 100 BPM | WEIGHT: 173.8 LBS

## 2021-08-11 DIAGNOSIS — F41.1 GAD (GENERALIZED ANXIETY DISORDER): Primary | ICD-10-CM

## 2021-08-11 DIAGNOSIS — R03.0 ELEVATED BLOOD PRESSURE READING WITHOUT DIAGNOSIS OF HYPERTENSION: ICD-10-CM

## 2021-08-11 DIAGNOSIS — F33.1 MODERATE RECURRENT MAJOR DEPRESSION (H): ICD-10-CM

## 2021-08-11 DIAGNOSIS — F90.9 ATTENTION DEFICIT HYPERACTIVITY DISORDER (ADHD), UNSPECIFIED ADHD TYPE: ICD-10-CM

## 2021-08-11 PROCEDURE — 99214 OFFICE O/P EST MOD 30 MIN: CPT | Performed by: NURSE PRACTITIONER

## 2021-08-11 RX ORDER — BUPROPION HYDROCHLORIDE 150 MG/1
150 TABLET ORAL EVERY MORNING
Qty: 90 TABLET | Refills: 3 | Status: SHIPPED | OUTPATIENT
Start: 2021-08-11 | End: 2021-08-24

## 2021-08-11 ASSESSMENT — ANXIETY QUESTIONNAIRES
3. WORRYING TOO MUCH ABOUT DIFFERENT THINGS: MORE THAN HALF THE DAYS
IF YOU CHECKED OFF ANY PROBLEMS ON THIS QUESTIONNAIRE, HOW DIFFICULT HAVE THESE PROBLEMS MADE IT FOR YOU TO DO YOUR WORK, TAKE CARE OF THINGS AT HOME, OR GET ALONG WITH OTHER PEOPLE: VERY DIFFICULT
2. NOT BEING ABLE TO STOP OR CONTROL WORRYING: NEARLY EVERY DAY
5. BEING SO RESTLESS THAT IT IS HARD TO SIT STILL: NEARLY EVERY DAY
7. FEELING AFRAID AS IF SOMETHING AWFUL MIGHT HAPPEN: MORE THAN HALF THE DAYS
6. BECOMING EASILY ANNOYED OR IRRITABLE: MORE THAN HALF THE DAYS
GAD7 TOTAL SCORE: 14
1. FEELING NERVOUS, ANXIOUS, OR ON EDGE: MORE THAN HALF THE DAYS

## 2021-08-11 ASSESSMENT — MIFFLIN-ST. JEOR: SCORE: 1701.82

## 2021-08-11 ASSESSMENT — PATIENT HEALTH QUESTIONNAIRE - PHQ9
SUM OF ALL RESPONSES TO PHQ QUESTIONS 1-9: 13
5. POOR APPETITE OR OVEREATING: NOT AT ALL

## 2021-08-11 NOTE — PATIENT INSTRUCTIONS
Start the bupropion once daily in the mornings- let Chloé know if you don't tolerate this  Records from psychiatry and your therapist  Blood pressure goal is <130/80  Check your blood pressures daily, keep a log, and come back in two weeks for a recheck. If pressures still elevated, will obtain labs, EKG, urine sample, and start medication.    Patient Education     High Blood Pressure, To Be Confirmed, No Treatment   Your blood pressure today was higher than normal. Sometimes anxiety, pain, or other issues can cause a short-term rise in blood pressure. It later returns to normal. Blood pressure that is high only one time doesn t mean that you have high blood pressure (hypertension). High blood pressure is a long-term (chronic) illness. But you should have your blood pressure measured again in the next few days to find out if it s still high.     Blood pressure measurements are given as 2 numbers. Systolic blood pressure is the upper number. This is the pressure when the heart contracts. Diastolic blood pressure is the lower number. This is the pressure when the heart relaxes between beats. You will see your blood pressure readings written together. For example, a person with a systolic pressure of 118 and a diastolic pressure of 78 will have 118/78 written in the medical record.   Blood pressure is classified as normal, raised (elevated), or stage 1 or stage 2 high blood pressure:     Normal blood pressure. Systolic of less than 120 and diastolic of less than 80 (120/80).    Elevated blood pressure. Systolic of 120 to 129 and diastolic less than 80.    Stage 1 high blood pressure. Systolic is 130 to 139 or diastolic between 80 to 89.    Stage 2 high blood pressure. Systolic is 140 or higher or the diastolic is 90 or higher.  Lifestyle changes can help manage your blood pressure. These include weight loss, exercise, and quitting smoking. Have your blood pressure checked regularly to be sure it is under control.    Home care  To track your blood pressure, your healthcare provider may ask you to come into the office at different times and on different days. If your provider asks you to check your readings at home, ask him or her what times of the day to test and for how many days. Before you leave the office, ask your provider to show you how to take your blood pressure. Ask questions if you don't understand something.   Using a home blood pressure monitor  Think about buying an automatic blood pressure monitor. Ask your provider for a recommendation as well as the correct size cuff to fit your arm. You can buy blood pressure monitors at most pharmacies.   The American Heart Association advises the following guidelines for home blood pressure monitoring:     Don't smoke or drink coffee or other caffeinated drinks for 30 minutes before taking your blood pressure.    Go to the bathroom before the test.    Relax for 5 minutes before taking the measurement.    Sit with your back supported (don't sit on a couch or soft chair). Keep your feet on the floor uncrossed. Place your arm on a solid flat surface (like a table) with the upper part of the arm at heart level. Place the middle of the cuff directly above the bend of the elbow. Check the monitor's instruction manual for an illustration.    Take multiple readings. When you measure, take 2 to 3 readings one minute apart. Record all of the results.    Take your blood pressure at the same time every day, or as your provider advises.    Record the date, time, and blood pressure reading.    Take the record with you to your next medical appointment. If your blood pressure monitor has a built-in memory, simply take the monitor with you to your next appointment.    Call your provider if you have several high readings. Don't be frightened by a single high blood pressure reading. But if you get a few high readings, check in with your provider.  Follow-up care  Keep all of your follow-up  appointments. If your blood pressure is more than 120 over 80 on 2 out of 3 days, you will need to follow up with your healthcare provider for more evaluation and treatment.   Don t put this off! High blood pressure can be treated. High blood pressure that s not treated raises your risk for heart attack, heart failure, kidney disease, and stroke.   Call 911  Call 911 if you have any of these:     Blood pressure of 180/120 or higher    Chest pain or shortness of breath    Weakness of an arm or leg or one side of the face    Problems speaking or seeing  When to get medical advice  Call your healthcare provider right away if any of these occur:     Severe headache    Throbbing or rushing sound in the ears    Nosebleed    Sudden severe pain in your belly (abdomen)    Extreme drowsiness, confusion, or fainting    Dizziness or dizziness with spinning feeling (vertigo)  [employer] last reviewed this educational content on 7/1/2019 2000-2021 [employer]. All rights reserved. This information is not intended as a substitute for professional medical care. Always follow your healthcare professional's instructions.         Patient Education     Eating Heart-Healthy Food: Using the DASH Plan    Eating for your heart doesn t have to be hard or boring. You just need to know how to make healthier choices. The DASH eating plan has been developed to help you do just that. DASH stands for Dietary Approaches to Stop Hypertension. It is a plan that has been proven to be healthier for your heart and to lower your risk for high blood pressure. It can also help lower your risk for cancer, heart disease, osteoporosis, and diabetes.  Choosing from each food group  Choose foods from each of the food groups below each day. Try to get the recommended number of servings for each food group. The serving numbers are based on a diet of 2,000 calories a day. Talk with your healthcare provider if you re not sure about your calorie  needs. Along with getting the correct servings, the DASH plan also advises less than 2,300 mg of salt (sodium) per day. Lowering sodium intake to 1,500 mg per day lowers blood pressure even more. (There's about 2,300 mg of sodium in 1 teaspoon of salt.)      Grains  Servings: 6 to 8 a day  A serving is:    1 slice bread    1 ounce dry cereal    Half a cup cooked rice, pasta or cereal  Best choices: Whole grains and any grains high in fiber. Vegetables  Servings: 4 to 5 a day  A serving is:    1 cup raw leafy vegetable    Half a cup cut-up raw or cooked vegetable    Half a cup vegetable juice  Best choices: Fresh or frozen vegetables prepared without added salt or fat.   Fruits  Servings: 4 to 5 a day  A serving is:    1 medium fruit    One-quarter cup dried fruit    Half a cup fresh, frozen, or canned fruit    Half a cup of 100% fruit juices  Best choices: A variety of fresh fruits of different colors. Whole fruits are a better choice than fruit juices. Low-fat or fat-free dairy  Servings: 2 to 3 a day  A serving is:    1 cup milk    1 cup yogurt    One and a half ounces cheese  Best choices: Skim or 1% milk, low-fat or fat-free yogurt or buttermilk, and low-fat cheeses.         Lean meats, poultry, fish  Servings: 6 or fewer a day  A serving is:    1 ounce cooked meats, poultry, or fish    1 egg  Best choices: Lean poultry and fish. Trim away visible fat. Broil, grill, roast, or boil instead of frying. Remove skin from poultry before eating. Limit how much red meat you eat.  Nuts, seeds, beans  Servings: 4 to 5 a week  A serving is:    One-third cup nuts (one and a half ounces)    2 tablespoons nut butter or seeds    Half a cup cooked dry beans or legumes  Best choices: Dry roasted nuts with no salt added, lentils, kidney beans, garbanzo beans, and whole jett beans.   Fats and oils  Servings: 2 to 3 a day  A serving is:    1 teaspoon vegetable oil    1 teaspoon soft margarine    1 tablespoon mayonnaise    2  tablespoons salad dressing  Best choices: Nut and vegetable oils (nontropical vegetable oils), such as olive and canola oil. Sweets  Servings: 5 a week or fewer  A serving is:    1 tablespoon sugar, maple syrup, or honey    1 tablespoon jam or jelly    1 half-ounce jelly beans (about 15)    1 cup lemonade  Best choices: Dried fruit can be a satisfying sweet. Choose low-fat sweets. And watch your serving sizes!      For more on the DASH eating plan, visit:  www.nhlbi.nih.gov/health/health-topics/topics/dash   [employer] last reviewed this educational content on 7/1/2019 2000-2021 [employer]. All rights reserved. This information is not intended as a substitute for professional medical care. Always follow your healthcare professional's instructions.         Patient Education     Low-Salt Choices  Eating salt (sodium) can make your body retain too much water. Extra water makes your heart work harder. Canned, packaged, and frozen foods are easy to prepare. But they are often high in sodium. Here are some ideas for low-salt foods you can easily make yourself.   For breakfast    Fruit or 100% fruit juice. It's better to have whole fruit instead of 100% fruit juice.    Whole-wheat bread or an English muffin. Look for sodium content on Nutrition Facts labels.    Low-fat milk or yogurt    Unsalted eggs    Shredded wheat    Corn tortillas    Unsalted steamed rice    Regular (not instant) hot cereal, made without salt    Stay away from    Sausage, turner, and ham    Flour tortillas    Packaged muffins, pancakes, and biscuits    Instant hot cereals    Cottage cheese    For lunch and dinner    Fresh fish, chicken, turkey, or meat--baked, broiled, or roasted without salt    Dry beans, cooked without salt    Tofu, stir-fried without salt    Unsalted fresh fruit and vegetables, or frozen or canned fruit and vegetables with no added salt  Stay away from    Lunch or deli meat that is cured or smoked    Cheese    Tomato  juice and ketchup    Canned vegetables, soups, and fish not labeled as no-salt-added or reduced sodium    Packaged gravies and sauces    Olives, pickles, and relish    Bottled salad dressings    For snacks and desserts    Yogurt    Unsalted, air-popped popcorn    Unsalted nuts or seeds  Stay away from    Pies and cakes    Packaged dessert mixes    Pizza    Canned and packaged puddings    Pretzels, chips, crackers, and nuts--unless the label says unsalted    [employer] last reviewed this educational content on 11/1/2019 2000-2021 [employer]. All rights reserved. This information is not intended as a substitute for professional medical care. Always follow your healthcare professional's instructions.

## 2021-08-11 NOTE — PROGRESS NOTES
"Problem(s) Oriented visit        SUBJECTIVE:                                                    Wellington Guaman is a 36 year old male who presents to clinic today for the following health issues :    Mental health: Feels \"stressed and anxious\" all the time. Had been seeing psychiatry but it was too expensive for him, had adverse effects with fluoxetine and escitalopram (sexual dysfunction, felt jittery). Had been seeing a therapist as well. He had been started on Adderall 5mg per psychiatry which was very helpful for him, dx with ADHD- symptoms of anxiety and depression improved with better control of ADHD. Does have significant depressive symptoms as well. No SI/HI. He and his wife are having another baby at the end of the month.    PHQ 5/19/2020 7/9/2020 7/6/2021   PHQ-9 Total Score 15 9 10   Q9: Thoughts of better off dead/self-harm past 2 weeks Several days Not at all Not at all     JOSEE-7 SCORE 8/10/2017 5/19/2020 7/9/2020   Total Score - - -   Total Score 9 20 10         Blood pressure: Reports BP started rising about 7/28/21, checked and was 140/90- worried him, started checking more frequently, rising over time. Notes that at some time he was checking his blood pressure every ten minutes because he was concerned. Had some flushing after eating with friends x1. No chest pain or pressure, trouble breathing, new vision changes, severe headaches. No urinary concerns. No caffeine or smoking. No alcohol in the past two weeks. Biking a couple miles daily and lifting weights. Sleep- wakes up around 5 or 6 because his daughter wakes up early. No family hx of renal artery stenosis, pheochromocytoma, or other secondary causes of hypertension.      Problem list, Medication list, Allergies, and Medical/Social/Surgical histories reviewed in [facility] and updated as appropriate.   Additional history: as documented    ROS:  Gen, CV, resp, GI, , MSK, neuro, endo, psych negative except as listed per HPI      OBJECTIVE:         " "                                           Physical Exam:    /82   Pulse 100   Temp 98.4  F (36.9  C) (Temporal)   Resp 18   Ht 1.734 m (5' 8.25\")   Wt 78.8 kg (173 lb 12.8 oz)   SpO2 98%   BMI 26.23 kg/m      CONSTITUTIONAL: Alert non-toxic appearing male in no acute distress  RESPIRATORY: Lungs clear to auscultation, respirations unlabored  CV: Regular rate and rhythm, S1S2, no clicks, murmurs, rubs, or gallops; BLE without edema; no aortic or renal artery bruits  GASTROINTESTINAL: Abdomen soft, non-distended, and non-tender to palpation  NEUROLOGIC: No gross deficits  PSYCHIATRIC: Pleasant and interactive, affect euthymic, makes appropriate eye contact, thought process logical       ASSESSMENT/PLAN:                                                          Wellington was seen today for hypertension.    Diagnoses and all orders for this visit:    JOSEE (generalized anxiety disorder)  Moderate recurrent major depression (H)  Attention deficit hyperactivity disorder (ADHD), unspecified ADHD type  -     buPROPion (WELLBUTRIN XL) 150 MG 24 hr tablet; Take 1 tablet (150 mg) by mouth every morning    Significant anxiety and depressive symptoms, question if these symptoms are worsening secondary to uncontrolled ADHD. He is hesitant to trial another selective serotonin reuptake inhibitor due to previous side effect profile. Discussed restarting stimulant medication for ADHD vs retrial of selective serotonin reuptake inhibitor vs trial of bupropion- after discussion of risks and benefits, will start bupropion 150mg daily in hopes to improve depressive symptoms as well as possible improvement in ADHD symptoms. Discussed that it may initially increase anxiety and we will need to monitor his blood pressure with this. Recheck with me in two weeks. He will have his psychiatry records released to me as well for review and confirmation of ADHD diagnosis. Reviewed side effects of medications, alarm signs and symptoms, and " when to seek further care.    Elevated blood pressure reading without diagnosis of hypertension    Slightly above goal <130/80. He did just bike into clinic today. We discussed checking his blood pressure once daily in the morning, feet on the floor, rested- to keep a log of his readings and bring back to clinic in two weeks for recheck. If BP remains elevated at that time, will work up HTN and initiate pharmacotherapy. Lifestyle changes reviewed and instructions given to patient.            The following health maintenance items are reviewed in Epic and correct as of today:  Health Maintenance   Topic Date Due     ADVANCE CARE PLANNING  Never done     HIV SCREENING  Never done     HEPATITIS C SCREENING  Never done     PREVENTIVE CARE VISIT  05/19/2021     INFLUENZA VACCINE (1) 09/01/2021     PHQ-9  10/23/2021     LIPID  05/19/2025     DTAP/TDAP/TD IMMUNIZATION (4 - Td or Tdap) 10/24/2029     DEPRESSION ACTION PLAN  Completed     COVID-19 Vaccine  Completed     Pneumococcal Vaccine: Pediatrics (0 to 5 Years) and At-Risk Patients (6 to 64 Years)  Aged Out     IPV IMMUNIZATION  Aged Out     MENINGITIS IMMUNIZATION  Aged Out     HEPATITIS B IMMUNIZATION  Aged Out       Patient Instructions     Start the bupropion once daily in the mornings- let Chloé know if you don't tolerate this  Records from psychiatry and your therapist  Blood pressure goal is <130/80  Check your blood pressures daily, keep a log, and come back in two weeks for a recheck. If pressures still elevated, will obtain labs, EKG, urine sample, and start medication.    Patient Education     High Blood Pressure, To Be Confirmed, No Treatment   Your blood pressure today was higher than normal. Sometimes anxiety, pain, or other issues can cause a short-term rise in blood pressure. It later returns to normal. Blood pressure that is high only one time doesn t mean that you have high blood pressure (hypertension). High blood pressure is a long-term (chronic)  illness. But you should have your blood pressure measured again in the next few days to find out if it s still high.     Blood pressure measurements are given as 2 numbers. Systolic blood pressure is the upper number. This is the pressure when the heart contracts. Diastolic blood pressure is the lower number. This is the pressure when the heart relaxes between beats. You will see your blood pressure readings written together. For example, a person with a systolic pressure of 118 and a diastolic pressure of 78 will have 118/78 written in the medical record.   Blood pressure is classified as normal, raised (elevated), or stage 1 or stage 2 high blood pressure:     Normal blood pressure. Systolic of less than 120 and diastolic of less than 80 (120/80).    Elevated blood pressure. Systolic of 120 to 129 and diastolic less than 80.    Stage 1 high blood pressure. Systolic is 130 to 139 or diastolic between 80 to 89.    Stage 2 high blood pressure. Systolic is 140 or higher or the diastolic is 90 or higher.  Lifestyle changes can help manage your blood pressure. These include weight loss, exercise, and quitting smoking. Have your blood pressure checked regularly to be sure it is under control.   Home care  To track your blood pressure, your healthcare provider may ask you to come into the office at different times and on different days. If your provider asks you to check your readings at home, ask him or her what times of the day to test and for how many days. Before you leave the office, ask your provider to show you how to take your blood pressure. Ask questions if you don't understand something.   Using a home blood pressure monitor  Think about buying an automatic blood pressure monitor. Ask your provider for a recommendation as well as the correct size cuff to fit your arm. You can buy blood pressure monitors at most pharmacies.   The American Heart Association advises the following guidelines for home blood pressure  monitoring:     Don't smoke or drink coffee or other caffeinated drinks for 30 minutes before taking your blood pressure.    Go to the bathroom before the test.    Relax for 5 minutes before taking the measurement.    Sit with your back supported (don't sit on a couch or soft chair). Keep your feet on the floor uncrossed. Place your arm on a solid flat surface (like a table) with the upper part of the arm at heart level. Place the middle of the cuff directly above the bend of the elbow. Check the monitor's instruction manual for an illustration.    Take multiple readings. When you measure, take 2 to 3 readings one minute apart. Record all of the results.    Take your blood pressure at the same time every day, or as your provider advises.    Record the date, time, and blood pressure reading.    Take the record with you to your next medical appointment. If your blood pressure monitor has a built-in memory, simply take the monitor with you to your next appointment.    Call your provider if you have several high readings. Don't be frightened by a single high blood pressure reading. But if you get a few high readings, check in with your provider.  Follow-up care  Keep all of your follow-up appointments. If your blood pressure is more than 120 over 80 on 2 out of 3 days, you will need to follow up with your healthcare provider for more evaluation and treatment.   Don t put this off! High blood pressure can be treated. High blood pressure that s not treated raises your risk for heart attack, heart failure, kidney disease, and stroke.   Call 911  Call 911 if you have any of these:     Blood pressure of 180/120 or higher    Chest pain or shortness of breath    Weakness of an arm or leg or one side of the face    Problems speaking or seeing  When to get medical advice  Call your healthcare provider right away if any of these occur:     Severe headache    Throbbing or rushing sound in the ears    Nosebleed    Sudden severe  pain in your belly (abdomen)    Extreme drowsiness, confusion, or fainting    Dizziness or dizziness with spinning feeling (vertigo)  [employer] last reviewed this educational content on 7/1/2019 2000-2021 [employer]. All rights reserved. This information is not intended as a substitute for professional medical care. Always follow your healthcare professional's instructions.         Patient Education     Eating Heart-Healthy Food: Using the DASH Plan    Eating for your heart doesn t have to be hard or boring. You just need to know how to make healthier choices. The DASH eating plan has been developed to help you do just that. DASH stands for Dietary Approaches to Stop Hypertension. It is a plan that has been proven to be healthier for your heart and to lower your risk for high blood pressure. It can also help lower your risk for cancer, heart disease, osteoporosis, and diabetes.  Choosing from each food group  Choose foods from each of the food groups below each day. Try to get the recommended number of servings for each food group. The serving numbers are based on a diet of 2,000 calories a day. Talk with your healthcare provider if you re not sure about your calorie needs. Along with getting the correct servings, the DASH plan also advises less than 2,300 mg of salt (sodium) per day. Lowering sodium intake to 1,500 mg per day lowers blood pressure even more. (There's about 2,300 mg of sodium in 1 teaspoon of salt.)      Grains  Servings: 6 to 8 a day  A serving is:    1 slice bread    1 ounce dry cereal    Half a cup cooked rice, pasta or cereal  Best choices: Whole grains and any grains high in fiber. Vegetables  Servings: 4 to 5 a day  A serving is:    1 cup raw leafy vegetable    Half a cup cut-up raw or cooked vegetable    Half a cup vegetable juice  Best choices: Fresh or frozen vegetables prepared without added salt or fat.   Fruits  Servings: 4 to 5 a day  A serving is:    1 medium  fruit    One-quarter cup dried fruit    Half a cup fresh, frozen, or canned fruit    Half a cup of 100% fruit juices  Best choices: A variety of fresh fruits of different colors. Whole fruits are a better choice than fruit juices. Low-fat or fat-free dairy  Servings: 2 to 3 a day  A serving is:    1 cup milk    1 cup yogurt    One and a half ounces cheese  Best choices: Skim or 1% milk, low-fat or fat-free yogurt or buttermilk, and low-fat cheeses.         Lean meats, poultry, fish  Servings: 6 or fewer a day  A serving is:    1 ounce cooked meats, poultry, or fish    1 egg  Best choices: Lean poultry and fish. Trim away visible fat. Broil, grill, roast, or boil instead of frying. Remove skin from poultry before eating. Limit how much red meat you eat.  Nuts, seeds, beans  Servings: 4 to 5 a week  A serving is:    One-third cup nuts (one and a half ounces)    2 tablespoons nut butter or seeds    Half a cup cooked dry beans or legumes  Best choices: Dry roasted nuts with no salt added, lentils, kidney beans, garbanzo beans, and whole jett beans.   Fats and oils  Servings: 2 to 3 a day  A serving is:    1 teaspoon vegetable oil    1 teaspoon soft margarine    1 tablespoon mayonnaise    2 tablespoons salad dressing  Best choices: Nut and vegetable oils (nontropical vegetable oils), such as olive and canola oil. Sweets  Servings: 5 a week or fewer  A serving is:    1 tablespoon sugar, maple syrup, or honey    1 tablespoon jam or jelly    1 half-ounce jelly beans (about 15)    1 cup lemonade  Best choices: Dried fruit can be a satisfying sweet. Choose low-fat sweets. And watch your serving sizes!      For more on the DASH eating plan, visit:  www.nhlbi.nih.gov/health/health-topics/topics/dash   [employer] last reviewed this educational content on 7/1/2019 2000-2021 [employer]. All rights reserved. This information is not intended as a substitute for professional medical care. Always follow your  healthcare professional's instructions.         Patient Education     Low-Salt Choices  Eating salt (sodium) can make your body retain too much water. Extra water makes your heart work harder. Canned, packaged, and frozen foods are easy to prepare. But they are often high in sodium. Here are some ideas for low-salt foods you can easily make yourself.   For breakfast    Fruit or 100% fruit juice. It's better to have whole fruit instead of 100% fruit juice.    Whole-wheat bread or an English muffin. Look for sodium content on Nutrition Facts labels.    Low-fat milk or yogurt    Unsalted eggs    Shredded wheat    Corn tortillas    Unsalted steamed rice    Regular (not instant) hot cereal, made without salt    Stay away from    Sausage, turner, and ham    Flour tortillas    Packaged muffins, pancakes, and biscuits    Instant hot cereals    Cottage cheese    For lunch and dinner    Fresh fish, chicken, turkey, or meat--baked, broiled, or roasted without salt    Dry beans, cooked without salt    Tofu, stir-fried without salt    Unsalted fresh fruit and vegetables, or frozen or canned fruit and vegetables with no added salt  Stay away from    Lunch or deli meat that is cured or smoked    Cheese    Tomato juice and ketchup    Canned vegetables, soups, and fish not labeled as no-salt-added or reduced sodium    Packaged gravies and sauces    Olives, pickles, and relish    Bottled salad dressings    For snacks and desserts    Yogurt    Unsalted, air-popped popcorn    Unsalted nuts or seeds  Stay away from    Pies and cakes    Packaged dessert mixes    Pizza    Canned and packaged puddings    Pretzels, chips, crackers, and nuts--unless the label says unsalted    [employer] last reviewed this educational content on 11/1/2019 2000-2021 [employer]. All rights reserved. This information is not intended as a substitute for professional medical care. Always follow your healthcare professional's instructions.                DARYL Abarca CNP  [facility]  [phone]    For any issues my office # is [phone]

## 2021-08-12 ASSESSMENT — ANXIETY QUESTIONNAIRES: GAD7 TOTAL SCORE: 14

## 2021-08-24 ENCOUNTER — OFFICE VISIT (OUTPATIENT)
Dept: FAMILY MEDICINE | Facility: CLINIC | Age: 36
End: 2021-08-24

## 2021-08-24 VITALS
DIASTOLIC BLOOD PRESSURE: 82 MMHG | SYSTOLIC BLOOD PRESSURE: 128 MMHG | HEART RATE: 102 BPM | BODY MASS INDEX: 26.83 KG/M2 | RESPIRATION RATE: 16 BRPM | HEIGHT: 68 IN | WEIGHT: 177 LBS | OXYGEN SATURATION: 98 %

## 2021-08-24 DIAGNOSIS — F32.0 MILD MAJOR DEPRESSION (H): ICD-10-CM

## 2021-08-24 DIAGNOSIS — F41.1 GAD (GENERALIZED ANXIETY DISORDER): ICD-10-CM

## 2021-08-24 DIAGNOSIS — F90.9 ATTENTION DEFICIT HYPERACTIVITY DISORDER (ADHD), UNSPECIFIED ADHD TYPE: Primary | ICD-10-CM

## 2021-08-24 PROCEDURE — 99214 OFFICE O/P EST MOD 30 MIN: CPT | Performed by: NURSE PRACTITIONER

## 2021-08-24 RX ORDER — DEXTROAMPHETAMINE SACCHARATE, AMPHETAMINE ASPARTATE, DEXTROAMPHETAMINE SULFATE AND AMPHETAMINE SULFATE 1.25; 1.25; 1.25; 1.25 MG/1; MG/1; MG/1; MG/1
5 TABLET ORAL 2 TIMES DAILY
Qty: 60 TABLET | Refills: 0 | Status: SHIPPED | OUTPATIENT
Start: 2021-08-24 | End: 2022-06-15

## 2021-08-24 ASSESSMENT — PATIENT HEALTH QUESTIONNAIRE - PHQ9
SUM OF ALL RESPONSES TO PHQ QUESTIONS 1-9: 10
5. POOR APPETITE OR OVEREATING: MORE THAN HALF THE DAYS

## 2021-08-24 ASSESSMENT — ANXIETY QUESTIONNAIRES
3. WORRYING TOO MUCH ABOUT DIFFERENT THINGS: MORE THAN HALF THE DAYS
5. BEING SO RESTLESS THAT IT IS HARD TO SIT STILL: NEARLY EVERY DAY
2. NOT BEING ABLE TO STOP OR CONTROL WORRYING: MORE THAN HALF THE DAYS
IF YOU CHECKED OFF ANY PROBLEMS ON THIS QUESTIONNAIRE, HOW DIFFICULT HAVE THESE PROBLEMS MADE IT FOR YOU TO DO YOUR WORK, TAKE CARE OF THINGS AT HOME, OR GET ALONG WITH OTHER PEOPLE: VERY DIFFICULT
GAD7 TOTAL SCORE: 16
7. FEELING AFRAID AS IF SOMETHING AWFUL MIGHT HAPPEN: MORE THAN HALF THE DAYS
6. BECOMING EASILY ANNOYED OR IRRITABLE: NEARLY EVERY DAY
1. FEELING NERVOUS, ANXIOUS, OR ON EDGE: MORE THAN HALF THE DAYS

## 2021-08-24 ASSESSMENT — MIFFLIN-ST. JEOR: SCORE: 1716.34

## 2021-08-24 NOTE — LETTER
[facility]  08/24/21  Patient: Wellington Guaman  YOB: 1985  Medical Record Number: [id number]                                                                                  Non-Opioid Controlled Substance Agreement    This is an agreement between you and your provider regarding safe and appropriate use of controlled substances prescribed by your care team. Controlled substances are?medicines that can cause physical and mental dependence (abuse).     There are strict laws about having and using these medicines. We here at [facility] are  committed to working with you in your efforts to get better. To support you in this work, we'll help you schedule regular office appointments for medicine refills. If we must cancel or change your appointment for any reason, we'll make sure you have enough medicine to last until your next appointment.     As a Provider, I will:     Listen carefully to your concerns while treating you with respect.     Recommend a treatment plan that I believe is in your best interest and may involve therapies other than medicine.      Talk with you often about the possible benefits and the risk of harm of any medicine that we prescribe for you.    Assess the safety of this medicine and check how well it works.      Provide a plan on how to taper (discontinue or go off) using this medicine if the decision is made to stop its use.      ::  As a Patient, I understand controlled substances:       Are prescribed by my care provider to help me function or work and manage my condition(s).?    Are strong medicines and can cause serious side effects.       Need to be taken exactly as prescribed.?Combining controlled substances with certain medicines or chemicals (such as illegal drugs, alcohol, sedatives, sleeping pills, and benzodiazepines) can be dangerous or even fatal.? If I stop taking my medicines suddenly, I may have severe withdrawal symptoms.     The risks,  benefits, and side effects of these medicine(s) were explained to me. I agree that:    1. I will take part in other treatments as advised by my care team. This may be psychiatry or counseling, physical therapy, behavioral therapy, group treatment or a referral to specialist.    2. I will keep all my appointments and understand this is part of the monitoring of controlled substances.?My care team may require an office visit for EVERY controlled substance refill. If I miss appointments or don t follow instructions, my care team may stop my medicine    3. I will take my medicines as prescribed. I will not change the dose or schedule unless my care team tells me to. There will be no refills if I run out early.      4. I may be asked to come to the clinic and complete a urine drug test or complete a pill count. If I don t give a urine sample or participate in a pill count, the care team may stop my medicine.    5. I will only receive controlled substance prescriptions from this clinic. If I am treated by another provider, I will tell them that I am taking controlled substances and that I have a treatment agreement with this provider. I will inform my [facility] care team within one business day if I am given a prescription for any controlled substance by another healthcare provider. My [facility] care team can contact other providers and pharmacists about my use of any medicines.    6. It is up to me to make sure that I don't run out of my medicines on weekends or holidays.?If my care team is willing to refill my prescription without a visit, I must request refills only during office hours. Refills may take up to 3 business days to process. I will use one pharmacy to fill all my controlled substance prescriptions. I will notify the clinic about any changes to my insurance or medicine availability.    7. I am responsible for my prescriptions. If the medicine/prescription is lost, stolen or destroyed, it will  not be replaced.?I also agree not to share controlled substance medicines with anyone.     8. I am aware I should not use any illegal or recreational drugs. I agree not to drink alcohol unless my care team says I can.     9. If I enroll in the [place] Medical Cannabis program, I will tell my care team before my next refill.    10. I will tell my care team right away if I become pregnant, have a new medical problem treated outside of my regular clinic, or have a change in my medicines.     11. I understand that this medicine can affect my thinking, judgment and reaction time.? Alcohol and drugs affect the brain and body, which can affect the safety of my driving. Being under the influence of alcohol or drugs can affect my decision-making, behaviors, personal safety and the safety of others. Driving while impaired (DWI) can occur if a person is driving, operating or in physical control of a car, motorcycle, boat, snowmobile, ATV, motorbike, off-road vehicle or any other motor vehicle ([place] Statute 169[place].20). I understand the risk if I choose to drive or operate any vehicle or machinery.    I understand that if I do not follow any of the conditions above, my prescriptions or treatment may be stopped or changed.   I agree that my provider, clinic care team and pharmacy may work with any city, state or federal law enforcement agency that investigates the misuse, sale or other diversion of my controlled medicine. I will allow my provider to discuss my care with, or share a copy of, this agreement with any other treating provider, pharmacy or emergency room where I receive care.     I have read this agreement and have asked questions about anything I did not understand.    ________________________________________________________  Patient Signature - Wellington Guaman     ___________________                   Date     ________________________________________________________  Provider Signature - DARYL Abarca CNP        ___________________                   Date     ________________________________________________________  Witness Signature (required if provider not present while patient signing)          ___________________                   Date

## 2021-08-24 NOTE — PROGRESS NOTES
"Problem(s) Oriented visit        SUBJECTIVE:                                                    Wellington Guaman is a 36 year old male who presents to clinic today for the following health issues :    BPs better controlled at home, 110s-120s/70s-80s. No chest pain or pressure, SOB/FIGUEROA, peripheral edema. Biked to clinic today again. HR elevated initially but returned to 102 after resting, tends to be his baseline.    Mental health: Still with significant symptoms of anxiety, poor concentration, irritability. He did not start bupropion due to concerns it would worsen anxiety. He has a hard time starting antidepressants- feels he has to get over the hump of starting these medications, tends to have nausea and feel worse initially. His second child is due this week and he does not want to be feeling poorly during this time, would like to hold off on antidepressants until after the baby arrives. Does have hx ADHD and was on Adderall in the past, did very well on this. Extended release form caused some difficulty sleeping, but 5mg IR in the morning and afternoon was helpful for him. Helped with concentration, tasks, irritability. Records received from psychiatry.    PHQ 7/6/2021 8/11/2021 8/24/2021   PHQ-9 Total Score 10 13 10   Q9: Thoughts of better off dead/self-harm past 2 weeks Not at all Not at all Not at all     JOSEE-7 SCORE 7/9/2020 8/11/2021 8/24/2021   Total Score - - -   Total Score 10 14 16           Problem list, Medication list, Allergies, and Medical/Social/Surgical histories reviewed in [facility] and updated as appropriate.   Additional history: as documented    ROS:  Gen, CV, resp, neuro, psych negative except as listed per HPI      OBJECTIVE:                                                    Physical Exam:    /82   Pulse 102   Resp 16   Ht 1.734 m (5' 8.25\")   Wt 80.3 kg (177 lb)   SpO2 98%   BMI 26.72 kg/m      CONSTITUTIONAL: Alert non-toxic appearing male in no acute distress  RESPIRATORY: " Lungs clear to auscultation, respirations unlabored  CV: Regular rate and rhythm, S1S2, no clicks, murmurs, rubs, or gallops; BLE without edema  GASTROINTESTINAL: Abdomen soft, non-distended, and non-tender to palpation  NEUROLOGIC: No gross deficits  PSYCHIATRIC: Pleasant and interactive, affect euthymic, makes appropriate eye contact, thought process logical       ASSESSMENT/PLAN:                                                          Wellington was seen today for recheck medication.    Diagnoses and all orders for this visit:    Attention deficit hyperactivity disorder (ADHD), unspecified ADHD type  JOSEE (generalized anxiety disorder)  Mild major depression (H)  -     amphetamine-dextroamphetamine (ADDERALL) 5 MG tablet; Take 1 tablet (5 mg) by mouth 2 times daily    Suspect his depressive symptoms are largely related to uncontrolled ADHD and anxiety. Trial of Adderall 5mg 1-2 times daily, but will need to monitor his anxiety, heart rate, and blood pressure closely. Follow up in one month. If anxiety is still bothersome at that time, will add in antidepressant and he is agreeable to this. We reviewed risks and benefits of stimulant medication, side effects, and safety in detail. [place]. [place] agreement signed. PHQ9/GAD7 done today. Check Utox at his visit next month.        The following health maintenance items are reviewed in Epic and correct as of today:  Health Maintenance   Topic Date Due     ADVANCE CARE PLANNING  Never done     HIV SCREENING  Never done     HEPATITIS C SCREENING  Never done     PREVENTIVE CARE VISIT  05/19/2021     INFLUENZA VACCINE (1) 09/01/2021     PHQ-9  02/24/2022     LIPID  05/19/2025     DTAP/TDAP/TD IMMUNIZATION (4 - Td or Tdap) 10/24/2029     DEPRESSION ACTION PLAN  Completed     COVID-19 Vaccine  Completed     Pneumococcal Vaccine: Pediatrics (0 to 5 Years) and At-Risk Patients (6 to 64 Years)  Aged Out     IPV IMMUNIZATION  Aged Out     MENINGITIS  IMMUNIZATION  Aged Out     HEPATITIS B IMMUNIZATION  Aged Out       Patient Instructions   Adderall 5mg in the mornings, can repeat around noon-na if you need it again during the day  Check your pressures in the morning once daily and let Chloé know if your pressures start to rise on the Adderall  Let's recheck in one month. Let me know if you reconsider an antidepressant before then, however, if your anxiety symptoms are still not well controlled with better control of the ADHD, we should talk about medication for this  Headspace иван!        DARYL Abarca CNP  [facility]  Family Practice  [facility]  [phone]    For any issues my office # is [phone]

## 2021-08-24 NOTE — PATIENT INSTRUCTIONS
Adderall 5mg in the mornings, can repeat around noon-na if you need it again during the day  Check your pressures in the morning once daily and let Chloé know if your pressures start to rise on the Adderall  Let's recheck in one month. Let me know if you reconsider an antidepressant before then, however, if your anxiety symptoms are still not well controlled with better control of the ADHD, we should talk about medication for this  Headspace иван!

## 2021-08-25 ASSESSMENT — ANXIETY QUESTIONNAIRES: GAD7 TOTAL SCORE: 16

## 2021-10-19 PROBLEM — F32.9 MAJOR DEPRESSION: Status: ACTIVE | Noted: 2020-07-09

## 2021-10-24 ENCOUNTER — HEALTH MAINTENANCE LETTER (OUTPATIENT)
Age: 36
End: 2021-10-24

## 2022-06-15 ENCOUNTER — OFFICE VISIT (OUTPATIENT)
Dept: FAMILY MEDICINE | Facility: CLINIC | Age: 37
End: 2022-06-15

## 2022-06-15 VITALS
HEIGHT: 68 IN | SYSTOLIC BLOOD PRESSURE: 127 MMHG | HEART RATE: 101 BPM | OXYGEN SATURATION: 99 % | DIASTOLIC BLOOD PRESSURE: 80 MMHG | TEMPERATURE: 97.6 F | WEIGHT: 183 LBS | BODY MASS INDEX: 27.74 KG/M2

## 2022-06-15 DIAGNOSIS — F41.1 GAD (GENERALIZED ANXIETY DISORDER): ICD-10-CM

## 2022-06-15 DIAGNOSIS — F90.2 ADHD (ATTENTION DEFICIT HYPERACTIVITY DISORDER), COMBINED TYPE: Primary | ICD-10-CM

## 2022-06-15 DIAGNOSIS — F32.0 MILD MAJOR DEPRESSION (H): ICD-10-CM

## 2022-06-15 LAB
AMPHETAMINES (AMP) UR: NEGATIVE
BARBITURATES (BAR) UR: NEGATIVE
BENZODIAZEPINES (BZO) UR: NEGATIVE
BUPRENORPHINE (BUP) UR: NEGATIVE
CANNABINOIDS (THC) UR: NEGATIVE
COCAINE (COC) UR: NEGATIVE
MDMA UR: NEGATIVE
METHADONE (MTD) UR: NEGATIVE
METHAMPHETAMINE (METHA) UR: NEGATIVE
MORPH/OPIATES (MOP) UR: NEGATIVE
OXYCODONE (OXYCO) UR: NEGATIVE
PCP UR: NEGATIVE
SPECIMEN VALIDITY INTERNAL QC UR: NORMAL
SPECIMEN VALIDITY TEMPERATURE UR: NORMAL
SPECIMEN VALIDITY UR CREATININE: NORMAL MG/DL (ref 20–200)
SPECIMEN VALIDITY UR PH: 4 (ref 4–9)
SPECIMEN VALIDITY UR SPECIFIC GRAVITY: 1.01 (ref 1–1.02)

## 2022-06-15 PROCEDURE — 99214 OFFICE O/P EST MOD 30 MIN: CPT | Performed by: NURSE PRACTITIONER

## 2022-06-15 PROCEDURE — 80306 DRUG TEST PRSMV INSTRMNT: CPT | Performed by: NURSE PRACTITIONER

## 2022-06-15 RX ORDER — METHYLPHENIDATE HYDROCHLORIDE 18 MG/1
18 TABLET ORAL EVERY MORNING
Qty: 30 TABLET | Refills: 0 | Status: SHIPPED | OUTPATIENT
Start: 2022-06-15 | End: 2022-08-04

## 2022-06-15 ASSESSMENT — ANXIETY QUESTIONNAIRES
GAD7 TOTAL SCORE: 10
2. NOT BEING ABLE TO STOP OR CONTROL WORRYING: SEVERAL DAYS
7. FEELING AFRAID AS IF SOMETHING AWFUL MIGHT HAPPEN: SEVERAL DAYS
6. BECOMING EASILY ANNOYED OR IRRITABLE: SEVERAL DAYS
IF YOU CHECKED OFF ANY PROBLEMS ON THIS QUESTIONNAIRE, HOW DIFFICULT HAVE THESE PROBLEMS MADE IT FOR YOU TO DO YOUR WORK, TAKE CARE OF THINGS AT HOME, OR GET ALONG WITH OTHER PEOPLE: SOMEWHAT DIFFICULT
5. BEING SO RESTLESS THAT IT IS HARD TO SIT STILL: NEARLY EVERY DAY
GAD7 TOTAL SCORE: 10
1. FEELING NERVOUS, ANXIOUS, OR ON EDGE: SEVERAL DAYS
3. WORRYING TOO MUCH ABOUT DIFFERENT THINGS: SEVERAL DAYS

## 2022-06-15 ASSESSMENT — PATIENT HEALTH QUESTIONNAIRE - PHQ9
5. POOR APPETITE OR OVEREATING: MORE THAN HALF THE DAYS
SUM OF ALL RESPONSES TO PHQ QUESTIONS 1-9: 7

## 2022-06-15 NOTE — LETTER
[facility]  06/15/22  Patient: Wellington Guaman  YOB: 1985  Medical Record Number: [id number]                                                                                  Non-Opioid Controlled Substance Agreement    This is an agreement between you and your provider regarding safe and appropriate use of controlled substances prescribed by your care team. Controlled substances are?medicines that can cause physical and mental dependence (abuse).     There are strict laws about having and using these medicines. We here at [facility] are  committed to working with you in your efforts to get better. To support you in this work, we'll help you schedule regular office appointments for medicine refills. If we must cancel or change your appointment for any reason, we'll make sure you have enough medicine to last until your next appointment.     As a Provider, I will:     Listen carefully to your concerns while treating you with respect.     Recommend a treatment plan that I believe is in your best interest and may involve therapies other than medicine.      Talk with you often about the possible benefits and the risk of harm of any medicine that we prescribe for you.    Assess the safety of this medicine and check how well it works.      Provide a plan on how to taper (discontinue or go off) using this medicine if the decision is made to stop its use.      ::  As a Patient, I understand controlled substances:       Are prescribed by my care provider to help me function or work and manage my condition(s).?    Are strong medicines and can cause serious side effects.       Need to be taken exactly as prescribed.?Combining controlled substances with certain medicines or chemicals (such as illegal drugs, alcohol, sedatives, sleeping pills, and benzodiazepines) can be dangerous or even fatal.? If I stop taking my medicines suddenly, I may have severe withdrawal symptoms.     The risks,  benefits, and side effects of these medicine(s) were explained to me. I agree that:    1. I will take part in other treatments as advised by my care team. This may be psychiatry or counseling, physical therapy, behavioral therapy, group treatment or a referral to specialist.    2. I will keep all my appointments and understand this is part of the monitoring of controlled substances.?My care team may require an office visit for EVERY controlled substance refill. If I miss appointments or don t follow instructions, my care team may stop my medicine    3. I will take my medicines as prescribed. I will not change the dose or schedule unless my care team tells me to. There will be no refills if I run out early.      4. I may be asked to come to the clinic and complete a urine drug test or complete a pill count. If I don t give a urine sample or participate in a pill count, the care team may stop my medicine.    5. I will only receive controlled substance prescriptions from this clinic. If I am treated by another provider, I will tell them that I am taking controlled substances and that I have a treatment agreement with this provider. I will inform my [facility] care team within one business day if I am given a prescription for any controlled substance by another healthcare provider. My [facility] care team can contact other providers and pharmacists about my use of any medicines.    6. It is up to me to make sure that I don't run out of my medicines on weekends or holidays.?If my care team is willing to refill my prescription without a visit, I must request refills only during office hours. Refills may take up to 3 business days to process. I will use one pharmacy to fill all my controlled substance prescriptions. I will notify the clinic about any changes to my insurance or medicine availability.    7. I am responsible for my prescriptions. If the medicine/prescription is lost, stolen or destroyed, it will  not be replaced.?I also agree not to share controlled substance medicines with anyone.     8. I am aware I should not use any illegal or recreational drugs. I agree not to drink alcohol unless my care team says I can.     9. If I enroll in the [place] Medical Cannabis program, I will tell my care team before my next refill.    10. I will tell my care team right away if I become pregnant, have a new medical problem treated outside of my regular clinic, or have a change in my medicines.     11. I understand that this medicine can affect my thinking, judgment and reaction time.? Alcohol and drugs affect the brain and body, which can affect the safety of my driving. Being under the influence of alcohol or drugs can affect my decision-making, behaviors, personal safety and the safety of others. Driving while impaired (DWI) can occur if a person is driving, operating or in physical control of a car, motorcycle, boat, snowmobile, ATV, motorbike, off-road vehicle or any other motor vehicle ([place] Statute 169A.20). I understand the risk if I choose to drive or operate any vehicle or machinery.    I understand that if I do not follow any of the conditions above, my prescriptions or treatment may be stopped or changed.   I agree that my provider, clinic care team and pharmacy may work with any city, state or federal law enforcement agency that investigates the misuse, sale or other diversion of my controlled medicine. I will allow my provider to discuss my care with, or share a copy of, this agreement with any other treating provider, pharmacy or emergency room where I receive care.     I have read this agreement and have asked questions about anything I did not understand.    ________________________________________________________  Patient Signature - Wellington Guaman     ___________________                   Date     ________________________________________________________  Provider Signature - DARYL Abarca CNP        ___________________                   Date     ________________________________________________________  Witness Signature (required if provider not present while patient signing)          ___________________                   Date

## 2022-06-15 NOTE — PATIENT INSTRUCTIONS
Start Concerta once daily in the mornings  I want to see you back in one month if it's working super great and you're doing well, two weeks if you feel like there isn't much improvement, or right away if you are having intolerable

## 2022-06-15 NOTE — PROGRESS NOTES
"Problem(s) Oriented visit        SUBJECTIVE:                                                    Wellington Guaman is a 37 year old male who presents today for the following:    CC: ADHD    Wellington was last seen around 9/2021 for ADHD and started back on Adderall 5mg 1-2 times daily. He felt this was helpful, but made him feel \"hungover\" the next day and he would avoid taking it- stopped after a few weeks. ADHD symptoms are unbearable most days for him- hyperactivity, difficulty with decision making and being decisive, executive function and planning. Gets into one thing and will hyperfocus on it, feels \"all over the place.\" He does have a slightly elevated heart rate at baseline, did not notice any heart palpitations or chest pain with Adderall.    Feels depression and anxiety are still present, previously on escitalopram- stopped medications altogether this past fall, he is unsure why. He feels anxiety spikes when he drives with his two small children- he feels a sense that something awful will happen. However, he states that otherwise anxiety and depression are manageable and not his biggest concern today. Denies suicidal ideation, panic attacks, or s/sxs patrick.      Alcohol- a couple beers per week. No tobacco or illicit drugs. No caffeine. Sleeping pretty well. No chest pain, chest pressure, or trouble breathing. Appetite is fine.      PHQ 2/11/2022 4/8/2022 6/15/2022   PHQ-9 Total Score 9 7 7   Q9: Thoughts of better off dead/self-harm past 2 weeks Not at all Not at all Not at all     JOSEE-7 SCORE 8/11/2021 8/24/2021 6/15/2022   Total Score - - -   Total Score 14 16 10             ROS:  Gen, CV, resp, GI, neuro, psych negative except as listed per HPI      OBJECTIVE:                                                    Physical Exam:    /80   Pulse 101   Temp 97.6  F (36.4  C)   Ht 1.734 m (5' 8.25\")   Wt 83 kg (183 lb)   SpO2 99%   BMI 27.62 kg/m      CONSTITUTIONAL: Alert non-toxic appearing male in " no acute distress  RESPIRATORY: Lungs clear to auscultation, respirations unlabored  CV: Regular rate and rhythm, S1S2, no clicks, murmurs, rubs, or gallops; BLE without edema  NEUROLOGIC: No gross deficits  PSYCHIATRIC: Pleasant and interactive, affect euthymic, fidgets frequently, makes appropriate eye contact, thought process logical       ASSESSMENT/PLAN:                                                          Wellington was seen today for a.d.h.d and recheck medication.    Diagnoses and all orders for this visit:    ADHD (attention deficit hyperactivity disorder), combined type  -     UScreen Toxisure (RMG)  -     methylphenidate HCl ER (CONCERTA) 18 MG CR tablet; Take 1 tablet (18 mg) by mouth every morning    Focus, organization, hyperactivity improved with Adderall but had adverse effects. Trial of Concerta 18mg daily, will likely need to increase dose. Reviewed side effects of medications, alarm signs and symptoms, and when to seek further care. Reviewed importance of setting up systems, therapy if possible. Safety and risks with stimulants reviewed. [place]. Urine tox screen as expected. New CSA filed. Recheck with me in 2-4 weeks, earlier with concerns.    Mild major depression (H)  JOSEE (generalized anxiety disorder)    Feels these are stable right now- will re-evaluate in 2-4 weeks to see if symptoms have improved with better control of ADHD. To update me in the interim if symptoms worsen.            Patient Instructions   Start Concerta once daily in the mornings  I want to see you back in one month if it's working super great and you're doing well, two weeks if you feel like there isn't much improvement, or right away if you are having intolerable          DARYL Abarca CNP  [facility]  Family Practice  [facility]  [phone]    For any issues my office # is [phone]

## 2022-07-31 ENCOUNTER — HEALTH MAINTENANCE LETTER (OUTPATIENT)
Age: 37
End: 2022-07-31

## 2022-08-01 ENCOUNTER — OFFICE VISIT (OUTPATIENT)
Dept: FAMILY MEDICINE | Facility: CLINIC | Age: 37
End: 2022-08-01

## 2022-08-01 VITALS
BODY MASS INDEX: 27.28 KG/M2 | HEIGHT: 68 IN | RESPIRATION RATE: 16 BRPM | OXYGEN SATURATION: 99 % | TEMPERATURE: 98.4 F | DIASTOLIC BLOOD PRESSURE: 82 MMHG | WEIGHT: 180 LBS | HEART RATE: 94 BPM | SYSTOLIC BLOOD PRESSURE: 118 MMHG

## 2022-08-01 DIAGNOSIS — F41.1 GAD (GENERALIZED ANXIETY DISORDER): Primary | ICD-10-CM

## 2022-08-01 DIAGNOSIS — F40.01 AGORAPHOBIA WITH PANIC ATTACKS: ICD-10-CM

## 2022-08-01 DIAGNOSIS — F90.9 ATTENTION DEFICIT HYPERACTIVITY DISORDER (ADHD), UNSPECIFIED ADHD TYPE: ICD-10-CM

## 2022-08-01 DIAGNOSIS — F33.41 RECURRENT MAJOR DEPRESSIVE DISORDER, IN PARTIAL REMISSION (H): ICD-10-CM

## 2022-08-01 PROCEDURE — 99214 OFFICE O/P EST MOD 30 MIN: CPT | Performed by: NURSE PRACTITIONER

## 2022-08-01 RX ORDER — ESCITALOPRAM OXALATE 10 MG/1
TABLET ORAL
Qty: 32 TABLET | Refills: 0 | Status: SHIPPED | OUTPATIENT
Start: 2022-08-01 | End: 2022-08-02

## 2022-08-01 ASSESSMENT — ANXIETY QUESTIONNAIRES
5. BEING SO RESTLESS THAT IT IS HARD TO SIT STILL: NEARLY EVERY DAY
3. WORRYING TOO MUCH ABOUT DIFFERENT THINGS: NEARLY EVERY DAY
GAD7 TOTAL SCORE: 20
GAD7 TOTAL SCORE: 20
1. FEELING NERVOUS, ANXIOUS, OR ON EDGE: NEARLY EVERY DAY
2. NOT BEING ABLE TO STOP OR CONTROL WORRYING: NEARLY EVERY DAY
6. BECOMING EASILY ANNOYED OR IRRITABLE: MORE THAN HALF THE DAYS
IF YOU CHECKED OFF ANY PROBLEMS ON THIS QUESTIONNAIRE, HOW DIFFICULT HAVE THESE PROBLEMS MADE IT FOR YOU TO DO YOUR WORK, TAKE CARE OF THINGS AT HOME, OR GET ALONG WITH OTHER PEOPLE: VERY DIFFICULT
7. FEELING AFRAID AS IF SOMETHING AWFUL MIGHT HAPPEN: NEARLY EVERY DAY

## 2022-08-01 ASSESSMENT — PATIENT HEALTH QUESTIONNAIRE - PHQ9
SUM OF ALL RESPONSES TO PHQ QUESTIONS 1-9: 8
5. POOR APPETITE OR OVEREATING: NEARLY EVERY DAY

## 2022-08-01 NOTE — PATIENT INSTRUCTIONS
Escitalopram 5mg daily for a week, then increase to 10mg daily  You have clonazepam on hand if you need for panic/anxiety  Let's get this under control then we can talk about adding back in Concerta vs Adderall  Cognitive behavioral therapy

## 2022-08-01 NOTE — PROGRESS NOTES
"Problem(s) Oriented visit        SUBJECTIVE:                                                    Wellington Guaman is a 37 year old male who presents today for the following:    ADHD follow up/mental health follow up: Things are \"not very good.\" Anxiety has been severe. Has been having panic attacks frequently with symptoms of agoraphobia. Used to have one every couple years, now a couple within the past month. Never started Concerta for ADHD given side effect profile. Had been on Lexapro in the past, stopped this after a period where he got sick, then just never restarted it but was generally pretty helpful, did have some sexual side effects and sweating with it. Had been on clonazepam in the past as well- has taken one tablet this month, caused him to sleep, then felt better afterward. Previously on multiple medications- did not tolerate fluoxetine, does not remember why paroxetine and sertraline were not tolerated or effective. Previously on Adderall for ADHD which was helpful- he states that if he restarted medication for ADHD, he would prefer to retry Adderall. Previously treated for ADHD, anxiety, depression at [facility] with Simon Taylor PA-C.    Kids are sleeping well  Work is pretty hard right now  Feels his relationship is \"pretty okay\" right now    At home he feels okay, but doing anything outside the home is a trigger for him. States fear struck him with the thought of going to a cabin, his wife had to drop him back home with his younger daughter.   JOSEE-7 SCORE 8/24/2021 6/15/2022 8/1/2022   Total Score - - -   Total Score 16 10 20       PHQ 4/8/2022 6/15/2022 8/1/2022   PHQ-9 Total Score 7 7 8   Q9: Thoughts of better off dead/self-harm past 2 weeks Not at all Not at all Not at all         ROS:  Gen, CV, resp, GI, neuro, psych negative except as listed per HPI      OBJECTIVE:                                                    Physical Exam:    /82   Pulse 94   Temp 98.4  F " "(36.9  C) (Temporal)   Resp 16   Ht 1.734 m (5' 8.25\")   Wt 81.6 kg (180 lb)   SpO2 99%   BMI 27.17 kg/m      CONSTITUTIONAL: Alert non-toxic appearing male in no acute distress  CV: Regular rate and rhythm, S1S2, no clicks, murmurs, rubs, or gallops  NEUROLOGIC: No gross deficits  PSYCHIATRIC: Pleasant and interactive, affect euthymic, makes appropriate eye contact, thought process logical       ASSESSMENT/PLAN:                                                          Wellington was seen today for recheck.    Diagnoses and all orders for this visit:    JOSEE (generalized anxiety disorder)  Agoraphobia with panic attacks  -     Escitalopram (LEXAPRO) 10 MG tablet; Take 0.5 tablets (5 mg) by mouth daily for 7 days, THEN 1 tablet (10 mg) daily for 28 days.  -     Adult Mental Health  Referral; Future    Worsening anxiety with agoraphobia and panic. Restart escitalopram as he generally felt symptoms were well controlled with this and this was a tolerable medication. Has clonazepam at home should he need it for panic. Referral to psychotherapy for CBT. Recheck with me in 2-4 weeks, earlier with concerns. Reviewed side effects of medications, alarm signs and symptoms, and when to seek further care.    Attention deficit hyperactivity disorder (ADHD), unspecified ADHD type    Holding off on restarting stimulants until acute worsening of anxiety, agoraphobia, and panic improves. May benefit from re-establishing with his psychiatry PA.    Recurrent major depressive disorder, in partial remission (H)    Feels depressive symptoms are generally controlled right now, denies s/sxs patrick or SI.            Patient Instructions   Escitalopram 5mg daily for a week, then increase to 10mg daily  You have clonazepam on hand if you need for panic/anxiety  Let's get this under control then we can talk about adding back in Concerta vs Adderall  Cognitive behavioral therapy      DARYL Abarca [facility] " GROUP  Family Practice  [facility]  [phone]    For any issues my office # is [phone]

## 2022-08-02 DIAGNOSIS — F41.1 GAD (GENERALIZED ANXIETY DISORDER): ICD-10-CM

## 2022-08-02 RX ORDER — ESCITALOPRAM OXALATE 10 MG/1
TABLET ORAL
Qty: 82 TABLET | Refills: 0 | Status: SHIPPED | OUTPATIENT
Start: 2022-08-02 | End: 2022-08-03

## 2022-10-15 ENCOUNTER — HEALTH MAINTENANCE LETTER (OUTPATIENT)
Age: 37
End: 2022-10-15

## 2023-04-10 ENCOUNTER — OFFICE VISIT (OUTPATIENT)
Dept: FAMILY MEDICINE | Facility: CLINIC | Age: 38
End: 2023-04-10

## 2023-04-10 VITALS
SYSTOLIC BLOOD PRESSURE: 126 MMHG | HEIGHT: 68 IN | OXYGEN SATURATION: 98 % | WEIGHT: 189.4 LBS | RESPIRATION RATE: 16 BRPM | DIASTOLIC BLOOD PRESSURE: 80 MMHG | BODY MASS INDEX: 28.7 KG/M2 | HEART RATE: 70 BPM

## 2023-04-10 DIAGNOSIS — F90.2 ADHD (ATTENTION DEFICIT HYPERACTIVITY DISORDER), COMBINED TYPE: ICD-10-CM

## 2023-04-10 PROCEDURE — 99214 OFFICE O/P EST MOD 30 MIN: CPT | Performed by: FAMILY MEDICINE

## 2023-04-10 RX ORDER — METHYLPHENIDATE HYDROCHLORIDE 18 MG/1
18 TABLET ORAL DAILY
Qty: 30 TABLET | Refills: 0 | Status: SHIPPED | OUTPATIENT
Start: 2023-04-10 | End: 2023-05-10

## 2023-04-10 RX ORDER — METHYLPHENIDATE HYDROCHLORIDE 18 MG/1
18 TABLET ORAL EVERY MORNING
Qty: 30 TABLET | Refills: 0 | Status: SHIPPED | OUTPATIENT
Start: 2023-07-10 | End: 2024-01-29

## 2023-04-10 RX ORDER — METHYLPHENIDATE HYDROCHLORIDE 18 MG/1
18 TABLET ORAL DAILY
Qty: 30 TABLET | Refills: 0 | Status: SHIPPED | OUTPATIENT
Start: 2023-06-11 | End: 2023-07-11

## 2023-04-10 RX ORDER — METHYLPHENIDATE HYDROCHLORIDE 18 MG/1
18 TABLET ORAL DAILY
Qty: 30 TABLET | Refills: 0 | Status: SHIPPED | OUTPATIENT
Start: 2023-05-11 | End: 2023-06-10

## 2023-04-10 ASSESSMENT — ANXIETY QUESTIONNAIRES
IF YOU CHECKED OFF ANY PROBLEMS ON THIS QUESTIONNAIRE, HOW DIFFICULT HAVE THESE PROBLEMS MADE IT FOR YOU TO DO YOUR WORK, TAKE CARE OF THINGS AT HOME, OR GET ALONG WITH OTHER PEOPLE: SOMEWHAT DIFFICULT
5. BEING SO RESTLESS THAT IT IS HARD TO SIT STILL: NEARLY EVERY DAY
3. WORRYING TOO MUCH ABOUT DIFFERENT THINGS: SEVERAL DAYS
6. BECOMING EASILY ANNOYED OR IRRITABLE: MORE THAN HALF THE DAYS
GAD7 TOTAL SCORE: 11
GAD7 TOTAL SCORE: 11
7. FEELING AFRAID AS IF SOMETHING AWFUL MIGHT HAPPEN: SEVERAL DAYS
1. FEELING NERVOUS, ANXIOUS, OR ON EDGE: SEVERAL DAYS
2. NOT BEING ABLE TO STOP OR CONTROL WORRYING: SEVERAL DAYS

## 2023-04-10 ASSESSMENT — PATIENT HEALTH QUESTIONNAIRE - PHQ9
5. POOR APPETITE OR OVEREATING: MORE THAN HALF THE DAYS
SUM OF ALL RESPONSES TO PHQ QUESTIONS 1-9: 9

## 2023-04-10 NOTE — PROGRESS NOTES
ASHLEY)CHAPARRO  Began current medication treatment a while ago and is in for a routine check in on the treatment, we currently are scheduling visits every 4 months.  Target symptoms of medication: Distractability and Finishing tasks:improved  Social History:   Social History     Tobacco Use     Smoking status: Never     Smokeless tobacco: Never   Vaping Use     Vaping status: Not on file   Substance Use Topics     Alcohol use: Yes     Comment: 4 beers/wk     Social History     Social History Narrative    IT retail support for [employer]    Wife: Eboni    Daughter: Janet    1-2 drinks per day    Exercise: minimal lately     Wt Readings from Last 4 Encounters:   04/10/23 85.9 kg (189 lb 6.4 oz)   08/01/22 81.6 kg (180 lb)   06/15/22 83 kg (183 lb)   08/24/21 80.3 kg (177 lb)        No problems with vigorous physical exercise.  No dizziness, no palpitations, no tachycardia, no chest pressure/pain.   No insomnia due to this ADD medication.    No mood changes.  CAROLYN(H)CHAPARRO discussion time: 25 minutes, over 1/2 in [facility]     Assessment/Plan:  Wellington was seen today for recheck medication.    Diagnoses and all orders for this visit:    ADHD (attention deficit hyperactivity disorder), combined type  -     methylphenidate HCl ER (CONCERTA) 18 MG CR tablet; Take 1 tablet (18 mg) by mouth every morning  -     methylphenidate HCl ER (CONCERTA) 18 MG CR tablet; Take 1 tablet (18 mg) by mouth daily for 30 days  -     methylphenidate HCl ER (CONCERTA) 18 MG CR tablet; Take 1 tablet (18 mg) by mouth daily for 30 days  -     methylphenidate HCl ER (CONCERTA) 18 MG CR tablet; Take 1 tablet (18 mg) by mouth daily for 30 days    Discussed ADD at length, including the typical symptoms and usual diagnostic criteria, the suspected pathophysiology, and the role of medications.  Discussed specific treatment options including stimulants (such as Adderall, Ritalin, and Strattera) including their possible side effects and the fact that they are controlled  substances which require special handling of prescriptions (require written RX's) and close monitoring including regular follow up.  I told the patient that any noncompliance with regard to prescriptions or follow up may result in us declining to provide further refills of these medications.  he is following up every 4 months and we will   We today managed his prescriptions with refills ensured to ensure availabilty of current medications.    Gabino Carranza MD   [facility]  [phone]

## 2023-08-20 ENCOUNTER — HEALTH MAINTENANCE LETTER (OUTPATIENT)
Age: 38
End: 2023-08-20

## 2024-01-29 ENCOUNTER — OFFICE VISIT (OUTPATIENT)
Dept: FAMILY MEDICINE | Facility: CLINIC | Age: 39
End: 2024-01-29

## 2024-01-29 VITALS
BODY MASS INDEX: 28.14 KG/M2 | SYSTOLIC BLOOD PRESSURE: 145 MMHG | OXYGEN SATURATION: 100 % | HEIGHT: 69 IN | WEIGHT: 190 LBS | HEART RATE: 100 BPM | DIASTOLIC BLOOD PRESSURE: 81 MMHG

## 2024-01-29 DIAGNOSIS — F90.2 ADHD (ATTENTION DEFICIT HYPERACTIVITY DISORDER), COMBINED TYPE: ICD-10-CM

## 2024-01-29 PROCEDURE — 99213 OFFICE O/P EST LOW 20 MIN: CPT | Performed by: FAMILY MEDICINE

## 2024-01-29 RX ORDER — METHYLPHENIDATE HYDROCHLORIDE 18 MG/1
18 TABLET ORAL DAILY
Qty: 30 TABLET | Refills: 0 | Status: SHIPPED | OUTPATIENT
Start: 2024-03-31 | End: 2024-04-30

## 2024-01-29 RX ORDER — METHYLPHENIDATE HYDROCHLORIDE 18 MG/1
18 TABLET ORAL DAILY
Qty: 30 TABLET | Refills: 0 | Status: SHIPPED | OUTPATIENT
Start: 2024-01-29 | End: 2024-02-28

## 2024-01-29 RX ORDER — METHYLPHENIDATE HYDROCHLORIDE 18 MG/1
18 TABLET ORAL DAILY
Qty: 30 TABLET | Refills: 0 | Status: SHIPPED | OUTPATIENT
Start: 2024-02-29 | End: 2024-03-30

## 2024-01-29 RX ORDER — METHYLPHENIDATE HYDROCHLORIDE 18 MG/1
18 TABLET ORAL EVERY MORNING
Qty: 30 TABLET | Refills: 0 | Status: SHIPPED | OUTPATIENT
Start: 2024-04-29

## 2024-01-29 NOTE — PROGRESS NOTES
Started Concerta for about the past 3 years,  Working very well.  Problem(s) Oriented visit      ROS:  General and Resp. completed and negative except as noted above     HISTORY:   reports current alcohol use.   reports that he has never smoked. He has never used smokeless tobacco.    See chart for additional current history and problem list    EXAM:  BP: 145/81   Pulse: 130    Temp: Data Unavailable    Wt Readings from Last 2 Encounters:   01/29/24 86.2 kg (190 lb)   04/10/23 85.9 kg (189 lb 6.4 oz)       BMI= Body mass index is 28.47 kg/m .    EXAM:  APPEARANCE: = Relaxed and in no distress  Neck = No anterior or posterior adenopathy appreciated.  Resp effort = Calm regular breathing  Breath Sounds = Good air movement with no rales or rhonchi in any lung fields      Assessment/Plan:  Wellington was seen today for recheck medication.    Diagnoses and all orders for this visit:    ADHD (attention deficit hyperactivity disorder), combined type  -     methylphenidate HCl ER, OSM, (CONCERTA) 18 MG CR tablet; Take 1 tablet (18 mg) by mouth every morning  -     methylphenidate HCl ER, OSM, (CONCERTA) 18 MG CR tablet; Take 1 tablet (18 mg) by mouth daily for 30 days  -     methylphenidate HCl ER, OSM, (CONCERTA) 18 MG CR tablet; Take 1 tablet (18 mg) by mouth daily for 30 days  -     methylphenidate HCl ER, OSM, (CONCERTA) 18 MG CR tablet; Take 1 tablet (18 mg) by mouth daily for 30 days    Discussed ADD at length, including the typical symptoms and usual diagnostic criteria, the suspected pathophysiology, and the role of medications.  Discussed specific treatment options including stimulants (such as Adderall, Ritalin, and Strattera) including their possible side effects and the fact that they are controlled substances which require special handling of prescriptions (require written RX's) and close monitoring including regular follow up.  I told the patient that any noncompliance with regard to prescriptions or follow up  "may result in us declining to provide further refills of these medications.  he is following up every 6 months and we will   We today managed his prescriptions with refills ensured to ensure availabilty of current medications.      COUNSELING:   reports that he has never smoked. He has never used smokeless tobacco.    Estimated body mass index is 28.47 kg/m  as calculated from the following:    Height as of this encounter: 1.74 m (5' 8.5\").    Weight as of this encounter: 86.2 kg (190 lb).       Appropriate preventive services were discussed with this patient, including applicable screening as appropriate for cardiovascular disease, diabetes, osteopenia/osteoporosis, and glaucoma.  As appropriate for age/gender, discussed screening for colorectal cancer, prostate cancer, breast cancer, and cervical cancer. Checklist reviewing preventive services available has been given to the patient.    Reviewed patients plan of care and provided an AVS. The Basic Care Plan (routine screening as documented in Health Maintenance) for Wellington meets the Care Plan requirement. This Care Plan has been established and reviewed with the  Patient.      The following health maintenance items are reviewed in Epic and correct as of today:  Health Maintenance   Topic Date Due    ADVANCE CARE PLANNING  Never done    HEPATITIS B IMMUNIZATION (1 of 3 - 3-dose series) Never done    IPV IMMUNIZATION (2 of 3 - 4-dose series) 12/02/1987    HIV SCREENING  Never done    HEPATITIS C SCREENING  Never done    YEARLY PREVENTIVE VISIT  05/19/2021    INFLUENZA VACCINE (1) 09/01/2023    COVID-19 Vaccine (2 - 2023-24 season) 09/01/2023    PHQ-9  10/10/2023    LIPID  05/19/2025    DTAP/TDAP/TD IMMUNIZATION (4 - Td or Tdap) 10/24/2029    DEPRESSION ACTION PLAN  Completed    Pneumococcal Vaccine: Pediatrics (0 to 5 Years) and At-Risk Patients (6 to 64 Years)  Aged Out    HPV IMMUNIZATION  Aged Out    MENINGITIS IMMUNIZATION  Aged Out    RSV MONOCLONAL ANTIBODY  " Aged Out       Gabino Carranza  [facility]  For any issues my office # is [phone]

## 2024-10-13 ENCOUNTER — HEALTH MAINTENANCE LETTER (OUTPATIENT)
Age: 39
End: 2024-10-13

## 2024-12-09 DIAGNOSIS — F90.2 ADHD (ATTENTION DEFICIT HYPERACTIVITY DISORDER), COMBINED TYPE: ICD-10-CM

## 2024-12-09 NOTE — TELEPHONE ENCOUNTER
CONTROLLED SUBSTANCE REFILL REQUEST FOR Concerta  DOSE: 18 mg - # 30  SIG: Take 1 tablet (18 mg) by mouth every morning - Oral       LAST REFILL: 9/30/24    LAST APPT RELATED: 1/29/24    NEXT APPT: 12/12/24      CONTROLLED SUBSTANCE AGREEMENT: 8/25/21    URINE DRUG SCREEN: 6/15/22        FILL HISTORY PER :          REFILL ROUTED TO Dr. Gabino Carranza FOR REVIEW

## 2024-12-10 RX ORDER — METHYLPHENIDATE HYDROCHLORIDE 18 MG/1
18 TABLET ORAL EVERY MORNING
Qty: 30 TABLET | Refills: 0 | Status: SHIPPED | OUTPATIENT
Start: 2024-12-10

## 2024-12-12 ENCOUNTER — VIRTUAL VISIT (OUTPATIENT)
Dept: FAMILY MEDICINE | Facility: CLINIC | Age: 39
End: 2024-12-12

## 2024-12-12 DIAGNOSIS — F90.9 ATTENTION DEFICIT HYPERACTIVITY DISORDER (ADHD), UNSPECIFIED ADHD TYPE: Primary | ICD-10-CM

## 2024-12-12 PROCEDURE — G2211 COMPLEX E/M VISIT ADD ON: HCPCS | Mod: 95 | Performed by: FAMILY MEDICINE

## 2024-12-12 PROCEDURE — 99213 OFFICE O/P EST LOW 20 MIN: CPT | Mod: 95 | Performed by: FAMILY MEDICINE

## 2024-12-12 RX ORDER — METHYLPHENIDATE HYDROCHLORIDE 18 MG/1
18 TABLET ORAL DAILY
Qty: 30 TABLET | Refills: 0 | Status: SHIPPED | OUTPATIENT
Start: 2025-01-11 | End: 2025-02-10

## 2024-12-12 RX ORDER — METHYLPHENIDATE HYDROCHLORIDE 18 MG/1
18 TABLET ORAL DAILY
Qty: 30 TABLET | Refills: 0 | Status: SHIPPED | OUTPATIENT
Start: 2025-02-10 | End: 2025-03-12

## 2024-12-12 RX ORDER — METHYLPHENIDATE HYDROCHLORIDE 18 MG/1
18 TABLET ORAL DAILY
Qty: 30 TABLET | Refills: 0 | Status: SHIPPED | OUTPATIENT
Start: 2024-12-12 | End: 2025-01-11

## 2024-12-12 NOTE — PROGRESS NOTES
"    Video Problem(s) Oriented visit      Video Start Time: 9:27 AM    The patient has been notified of following:     \"This video visit will be conducted via a call between you and your physician/provider. We have found that certain health care needs can be provided without the need for an in-person physical exam.  This service lets us provide the care you need with a video conversation.  If a prescription is necessary we can send it directly to your pharmacy.  If lab work is needed we can place an order for that and you can then stop by our lab to have the test done at a later time.    Video visits are billed at different rates depending on your insurance coverage.  Please reach out to your insurance provider with any questions.    If during the course of the call the physician/provider feels a video visit is not appropriate, you will not be charged for this service.\"    Patient has given verbal consent for Video visit? Yes    How would you like to obtain your AVS? MyChart    Will anyone else be joining your video visit? No  SUBJECTIVE:                                                    Wellington Guaman is a 39 year old male who presents to clinic today for the following health issues :    Has been unable to get regularly get fefills  AD(H)D  Began current medication treatment a while ago and is in for a routine check in on the treatment, we currently are scheduling visits every 6 months.  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  No dizziness, no palpitations, no tachycardia, no chest pressure/pain.   No insomnia due to this ADD medication.    No mood changes.  AD(H)D discussion time: 10 minutes, over 1/2 in couseling           Problem list, Medication list, Allergies, and Medical/Social/Surgical histories reviewed in [facility] and updated as appropriate.   Additional history: as documented    ROS:  General and CV completed and negative except as noted above    Histories:   Patient Active Problem List   Diagnosis    Hyperlipidemia with target LDL less than 130    Mild recurrent major depression since 2006     Panic attacks    Tachycardia    PVC's (premature ventricular contractions)    Fear of flying    Gastroesophageal reflux disease without esophagitis    Panic disorder without agoraphobia    JOSEE (generalized anxiety disorder)    Mild major depression (H)    Difficulty concentrating    Elevated blood pressure reading without diagnosis of hypertension    Attention deficit hyperactivity disorder (ADHD), unspecified ADHD type     Past Surgical History:   Procedure Laterality Date    NO HISTORY OF SURGERY         Social History     Tobacco Use    Smoking status: Never    Smokeless tobacco: Never   Substance Use Topics    Alcohol use: Yes     Comment: 4 beers/wk     Family History   Problem Relation Age of Onset    Family History Negative Mother     Depression Mother     Family History Negative Father     Cerebrovascular Disease Other            OBJECTIVE:                                                    There were no vitals taken for this visit.  There is no height or weight on file to calculate BMI.   APPEARANCE: = Relaxed and in no distress  Resp effort = Calm regular breathing  Recent/Remote Memory = Alert and Oriented x 3  NEURO = CN II-XII are tested and no deficits found  Mood/Affect = Cooperative and interested     ASSESSMENT/PLAN:                                                         Wellington was seen today for recheck medication.    Diagnoses and all orders for this visit:    Attention deficit hyperactivity disorder (ADHD), unspecified ADHD type  -     methylphenidate HCl ER, OSM, (CONCERTA) 18 MG CR tablet; Take 1 tablet (18 mg) by mouth daily.  -     methylphenidate HCl ER, OSM, (CONCERTA) 18 MG CR tablet; Take 1 tablet (18 mg) by mouth daily.  -     methylphenidate HCl ER, OSM, (CONCERTA) 18 MG CR tablet; Take 1 tablet (18 mg) by mouth daily.    Discussed ADD at length, including the typical symptoms and usual diagnostic criteria, the suspected pathophysiology, and the role of medications.  Discussed specific treatment options including stimulants (such as Adderall, Ritalin, and Strattera) including their possible side effects and the fact that they are controlled substances which require special handling of prescriptions (require written RX's) and close monitoring including regular follow up.  I told the patient that any noncompliance with regard to prescriptions or follow up may result in us declining to provide further refills of these medications.      Regular exercise  There are no Patient Instructions on file for this visit.    The following health maintenance items are reviewed in Epic and correct as of today:  Health Maintenance   Topic Date Due    ADVANCE CARE PLANNING  Never done    HIV SCREENING  Never done    HEPATITIS C SCREENING  Never done    HEPATITIS B IMMUNIZATION (1 of 3 - 19+ 3-dose series) Never done    YEARLY PREVENTIVE VISIT  05/19/2021    LIPID  05/19/2021    GLUCOSE  05/19/2023    PHQ-9  10/10/2023    INFLUENZA VACCINE (1) 09/01/2024    COVID-19 Vaccine (2 - 2024-25 season) 09/01/2024    DTAP/TDAP/TD IMMUNIZATION (4 - Td or Tdap) 10/24/2029    RSV VACCINE (1 - 1-dose 75+ series) 05/02/2061    DEPRESSION ACTION PLAN  Completed    Pneumococcal Vaccine: Pediatrics (0 to 5 Years) and At-Risk Patients (6 to 64 Years)   Aged Out    HPV IMMUNIZATION  Aged Out    MENINGITIS IMMUNIZATION  Aged Out    RSV MONOCLONAL ANTIBODY  Aged Out     The longitudinal plan of care for the diagnosis(es)/condition(s) as documented were addressed during this visit. Due to the added complexity in care, I will continue to support Wellington in the subsequent management and with ongoing continuity of care.  Video-Visit Details    This visit is being conducted as a virtual visit due to the emphasis on mitigation of the COVID-19 virus pandemic.   Type of service:  Video Visit    Originating Location (pt. Location): Home    Distant Location (provider location):  [facility]     Platform used for Video Visit: Tejas Carranza MD  [facility]  Family Practice  [facility]  [phone]    Video End Time:9:33 AM  For any issues my office # is [phone]

## 2025-07-07 ENCOUNTER — OFFICE VISIT (OUTPATIENT)
Dept: FAMILY MEDICINE | Facility: CLINIC | Age: 40
End: 2025-07-07

## 2025-07-07 VITALS
BODY MASS INDEX: 27.8 KG/M2 | SYSTOLIC BLOOD PRESSURE: 122 MMHG | OXYGEN SATURATION: 100 % | DIASTOLIC BLOOD PRESSURE: 88 MMHG | HEIGHT: 68 IN | HEART RATE: 100 BPM | WEIGHT: 183.4 LBS

## 2025-07-07 DIAGNOSIS — Z13.6 SCREENING FOR HEART DISEASE: ICD-10-CM

## 2025-07-07 DIAGNOSIS — F90.2 ADHD (ATTENTION DEFICIT HYPERACTIVITY DISORDER), COMBINED TYPE: ICD-10-CM

## 2025-07-07 DIAGNOSIS — E78.5 HYPERLIPIDEMIA WITH TARGET LDL LESS THAN 130: Primary | Chronic | ICD-10-CM

## 2025-07-07 LAB
% GRANULOCYTES: 55.6 % (ref 42.2–75.2)
ALBUMIN SERPL BCG-MCNC: 4.4 G/DL (ref 3.5–5.2)
ALP SERPL-CCNC: 96 U/L (ref 40–150)
ALT SERPL W P-5'-P-CCNC: 25 U/L (ref 0–70)
ANION GAP SERPL CALCULATED.3IONS-SCNC: 13 MMOL/L (ref 7–15)
AST SERPL W P-5'-P-CCNC: 17 U/L (ref 0–45)
BILIRUB SERPL-MCNC: 0.6 MG/DL
BUN SERPL-MCNC: 14.6 MG/DL (ref 6–20)
CALCIUM SERPL-MCNC: 9.2 MG/DL (ref 8.8–10.4)
CHLORIDE SERPL-SCNC: 98 MMOL/L (ref 98–107)
CHOLESTEROL: 208 MG/DL (ref 100–199)
CREAT SERPL-MCNC: 0.82 MG/DL (ref 0.67–1.17)
EGFRCR SERPLBLD CKD-EPI 2021: >90 ML/MIN/1.73M2
FASTING STATUS PATIENT QL REPORTED: YES
FASTING?: YES
GLUCOSE SERPL-MCNC: 91 MG/DL (ref 70–99)
HCO3 SERPL-SCNC: 25 MMOL/L (ref 22–29)
HCT VFR BLD AUTO: 44.9 % (ref 39–51)
HDL (RMG): 54 MG/DL (ref 40–?)
HEMOGLOBIN: 15.9 G/DL (ref 13.4–17.5)
LDL CALCULATED (RMG): 104 MG/DL (ref 0–130)
LYMPHOCYTES NFR BLD AUTO: 36.9 % (ref 20.5–51.1)
MCH RBC QN AUTO: 30 PG (ref 27–31)
MCHC RBC AUTO-ENTMCNC: 35.4 G/DL (ref 33–37)
MCV RBC AUTO: 84.7 FL (ref 80–100)
MONOCYTES NFR BLD AUTO: 7.5 % (ref 1.7–9.3)
PLATELET # BLD AUTO: 372 K/UL (ref 140–450)
POTASSIUM SERPL-SCNC: 3.8 MMOL/L (ref 3.4–5.3)
PROT SERPL-MCNC: 7.6 G/DL (ref 6.4–8.3)
RBC # BLD AUTO: 5.3 X10/CMM (ref 4.2–5.9)
SODIUM SERPL-SCNC: 136 MMOL/L (ref 135–145)
TRIGLYCERIDES (RMG): 248 MG/DL (ref 0–149)
WBC # BLD AUTO: 8.5 X10/CMM (ref 3.8–11)

## 2025-07-07 PROCEDURE — 80061 LIPID PANEL: CPT | Mod: QW | Performed by: FAMILY MEDICINE

## 2025-07-07 PROCEDURE — G2211 COMPLEX E/M VISIT ADD ON: HCPCS | Performed by: FAMILY MEDICINE

## 2025-07-07 PROCEDURE — 36415 COLL VENOUS BLD VENIPUNCTURE: CPT | Performed by: FAMILY MEDICINE

## 2025-07-07 PROCEDURE — 80053 COMPREHEN METABOLIC PANEL: CPT | Performed by: FAMILY MEDICINE

## 2025-07-07 PROCEDURE — 99213 OFFICE O/P EST LOW 20 MIN: CPT | Performed by: FAMILY MEDICINE

## 2025-07-07 PROCEDURE — 3074F SYST BP LT 130 MM HG: CPT | Performed by: FAMILY MEDICINE

## 2025-07-07 PROCEDURE — 85025 COMPLETE CBC W/AUTO DIFF WBC: CPT | Performed by: FAMILY MEDICINE

## 2025-07-07 PROCEDURE — 3079F DIAST BP 80-89 MM HG: CPT | Performed by: FAMILY MEDICINE

## 2025-07-07 RX ORDER — METHYLPHENIDATE HYDROCHLORIDE 18 MG/1
18 TABLET ORAL EVERY MORNING
Qty: 30 TABLET | Refills: 0 | Status: CANCELLED | OUTPATIENT
Start: 2025-07-07

## 2025-07-07 RX ORDER — METHYLPHENIDATE HYDROCHLORIDE 18 MG/1
18 TABLET ORAL DAILY
Qty: 30 TABLET | Refills: 0 | Status: SHIPPED | OUTPATIENT
Start: 2025-08-06 | End: 2025-09-05

## 2025-07-07 RX ORDER — METHYLPHENIDATE HYDROCHLORIDE 18 MG/1
18 TABLET ORAL DAILY
Qty: 30 TABLET | Refills: 0 | Status: SHIPPED | OUTPATIENT
Start: 2025-09-05 | End: 2025-10-05

## 2025-07-07 RX ORDER — METHYLPHENIDATE HYDROCHLORIDE 18 MG/1
18 TABLET ORAL DAILY
Qty: 30 TABLET | Refills: 0 | Status: SHIPPED | OUTPATIENT
Start: 2025-07-07 | End: 2025-08-06

## 2025-07-07 ASSESSMENT — PATIENT HEALTH QUESTIONNAIRE - PHQ9
10. IF YOU CHECKED OFF ANY PROBLEMS, HOW DIFFICULT HAVE THESE PROBLEMS MADE IT FOR YOU TO DO YOUR WORK, TAKE CARE OF THINGS AT HOME, OR GET ALONG WITH OTHER PEOPLE: SOMEWHAT DIFFICULT
SUM OF ALL RESPONSES TO PHQ QUESTIONS 1-9: 7
SUM OF ALL RESPONSES TO PHQ QUESTIONS 1-9: 7

## 2025-07-07 NOTE — PROGRESS NOTES
JEAN-BAPTISTE (H)  Began current medication treatment a while ago and is in for a routine check in on the treatment, we currently are scheduling visits every 6 months.  Target symptoms of medication: Attention span, Distractability, and Finishing tasks:not changed  Social History:   Social History     Tobacco Use    Smoking status: Never    Smokeless tobacco: Never   Substance Use Topics    Alcohol use: Yes     Comment: 4 beers/wk     Social History     Social History Narrative    IT retail support for [employer]    Wife: Eboni    Daughter: Janet    1-2 drinks per day    Exercise: minimal lately     Wt Readings from Last 4 Encounters:   07/07/25 83.2 kg (183 lb 6.4 oz)   01/29/24 86.2 kg (190 lb)   04/10/23 85.9 kg (189 lb 6.4 oz)   08/01/22 81.6 kg (180 lb)        No problems with vigorous physical exercise.  No dizziness, no palpitations, no tachycardia, no chest pressure/pain.   No insomnia due to this ADD medication.    No mood changes.  AD(H)D discussion time: 20 minutes, over 1/2 in couseling    Also due for labs.  Assessment/Plan:  Wellington was seen today for recheck medication.    Diagnoses and all orders for this visit:    Hyperlipidemia with target LDL less than 130  -     CBC with Diff/Plt ([facility])    ADHD (attention deficit hyperactivity disorder), combined type  Reviewed the status of his AD(RAMIREZ)D management status at length. he is satisfied with his current treatment including performance in work or school.  Discussed specific treatment including their possible side effects and the fact that they are controlled substances which require special handling of prescriptions and our need for close monitoring including regular follow up.    he desires to continue the current medication and agrees to the current schedule for visits every 6 month(s).  I reminded him that any noncompliance with regard to prescriptions or follow up may result in my declining to provide further refills of these medications.    -     methylphenidate  HCl ER, OSM, (CONCERTA) 18 MG CR tablet; Take 1 tablet (18 mg) by mouth daily.  -     methylphenidate HCl ER, OSM, (CONCERTA) 18 MG CR tablet; Take 1 tablet (18 mg) by mouth daily.  -     methylphenidate HCl ER, OSM, (CONCERTA) 18 MG CR tablet; Take 1 tablet (18 mg) by mouth daily.    Screening for heart disease  -     VENOUS COLLECTION  -     Lipid Profile ([facility])  -     Comprehensive metabolic panel; Future      Gabino Carranza MD   [facility]  [phone]              Answers submitted by the patient for this visit:  Patient Health Questionnaire (Submitted on 7/7/2025)  If you checked off any problems, how difficult have these problems made it for you to do your work, take care of things at home, or get along with other people?: Somewhat difficult  PHQ9 TOTAL SCORE: 7  General Questionnaire (Submitted on 7/7/2025)  Chief Complaint: Chronic problems general questions HPI Form  What is the reason for your visit today? : Medication refill  How many servings of fruits and vegetables do you eat daily?: 2-3  On average, how many sweetened beverages do you drink each day (Examples: soda, juice, sweet tea, etc.  Do NOT count diet or artificially sweetened beverages)?: 1  How many minutes a day do you exercise enough to make your heart beat faster?: 30 to 60  How many days a week do you exercise enough to make your heart beat faster?: 5  How many days per week do you miss taking your medication?: 2  Questionnaire about: Chronic problems general questions HPI Form (Submitted on 7/7/2025)  Chief Complaint: Chronic problems general questions HPI Form